# Patient Record
Sex: MALE | Race: WHITE
[De-identification: names, ages, dates, MRNs, and addresses within clinical notes are randomized per-mention and may not be internally consistent; named-entity substitution may affect disease eponyms.]

---

## 2018-05-18 PROBLEM — Z00.00 ENCOUNTER FOR PREVENTIVE HEALTH EXAMINATION: Status: ACTIVE | Noted: 2018-05-18

## 2018-05-31 PROBLEM — Z82.49 FAMILY HISTORY OF HYPERTENSION: Status: ACTIVE | Noted: 2018-05-31

## 2018-05-31 PROBLEM — Z82.69 FAMILY HISTORY OF SYSTEMIC LUPUS ERYTHEMATOSUS: Status: ACTIVE | Noted: 2018-05-31

## 2018-05-31 RX ORDER — MULTIVIT-MIN/IRON/FOLIC ACID/K 18-600-40
500 CAPSULE ORAL
Refills: 0 | Status: ACTIVE | COMMUNITY

## 2018-05-31 RX ORDER — SACUBITRIL AND VALSARTAN 49; 51 MG/1; MG/1
49-51 TABLET, FILM COATED ORAL
Qty: 180 | Refills: 0 | Status: ACTIVE | COMMUNITY

## 2018-05-31 RX ORDER — WARFARIN SODIUM 6 MG/1
6 TABLET ORAL
Refills: 0 | Status: ACTIVE | COMMUNITY

## 2018-05-31 RX ORDER — FOLIC ACID 1 MG/1
1 TABLET ORAL
Refills: 0 | Status: ACTIVE | COMMUNITY

## 2018-05-31 RX ORDER — DIGOXIN 125 UG/1
125 TABLET ORAL DAILY
Qty: 30 | Refills: 6 | Status: ACTIVE | COMMUNITY

## 2018-05-31 RX ORDER — ASPIRIN 81 MG
81 TABLET, DELAYED RELEASE (ENTERIC COATED) ORAL
Refills: 0 | Status: ACTIVE | COMMUNITY

## 2018-05-31 RX ORDER — EPLERENONE 25 MG/1
25 TABLET, COATED ORAL
Qty: 90 | Refills: 1 | Status: ACTIVE | COMMUNITY

## 2018-05-31 RX ORDER — CARVEDILOL 12.5 MG/1
12.5 TABLET, FILM COATED ORAL
Refills: 0 | Status: ACTIVE | COMMUNITY

## 2018-06-04 ENCOUNTER — APPOINTMENT (OUTPATIENT)
Dept: CARDIOLOGY | Facility: CLINIC | Age: 58
End: 2018-06-04
Payer: COMMERCIAL

## 2018-06-04 VITALS
HEART RATE: 45 BPM | OXYGEN SATURATION: 97 % | BODY MASS INDEX: 27.31 KG/M2 | HEIGHT: 67 IN | DIASTOLIC BLOOD PRESSURE: 75 MMHG | SYSTOLIC BLOOD PRESSURE: 135 MMHG | WEIGHT: 174 LBS

## 2018-06-04 VITALS
DIASTOLIC BLOOD PRESSURE: 71 MMHG | SYSTOLIC BLOOD PRESSURE: 107 MMHG | HEIGHT: 67 IN | HEART RATE: 79 BPM | WEIGHT: 145 LBS | OXYGEN SATURATION: 97 % | BODY MASS INDEX: 22.76 KG/M2

## 2018-06-04 DIAGNOSIS — Z87.898 PERSONAL HISTORY OF OTHER SPECIFIED CONDITIONS: ICD-10-CM

## 2018-06-04 DIAGNOSIS — Z82.49 FAMILY HISTORY OF ISCHEMIC HEART DISEASE AND OTHER DISEASES OF THE CIRCULATORY SYSTEM: ICD-10-CM

## 2018-06-04 DIAGNOSIS — I42.8 OTHER CARDIOMYOPATHIES: ICD-10-CM

## 2018-06-04 DIAGNOSIS — Z86.79 PERSONAL HISTORY OF OTHER DISEASES OF THE CIRCULATORY SYSTEM: ICD-10-CM

## 2018-06-04 DIAGNOSIS — Z82.69 FAMILY HISTORY OF OTHER DISEASES OF THE MUSCULOSKELETAL SYSTEM AND CONNECTIVE TISSUE: ICD-10-CM

## 2018-06-04 DIAGNOSIS — Z95.810 PRESENCE OF AUTOMATIC (IMPLANTABLE) CARDIAC DEFIBRILLATOR: ICD-10-CM

## 2018-06-04 PROCEDURE — 93284 PRGRMG EVAL IMPLANTABLE DFB: CPT

## 2018-06-04 PROCEDURE — 99203 OFFICE O/P NEW LOW 30 MIN: CPT | Mod: 25

## 2018-06-04 PROCEDURE — 93000 ELECTROCARDIOGRAM COMPLETE: CPT | Mod: 59

## 2018-06-04 RX ORDER — LOSARTAN POTASSIUM 100 MG/1
100 TABLET, FILM COATED ORAL
Refills: 0 | Status: DISCONTINUED | COMMUNITY
End: 2018-06-04

## 2018-06-04 RX ORDER — TAMSULOSIN HYDROCHLORIDE 0.4 MG/1
0.4 CAPSULE ORAL
Refills: 0 | Status: ACTIVE | COMMUNITY

## 2018-06-04 RX ORDER — CEFADROXIL 500 MG/1
500 CAPSULE ORAL
Refills: 0 | Status: ACTIVE | COMMUNITY

## 2018-06-11 ENCOUNTER — OTHER (OUTPATIENT)
Age: 58
End: 2018-06-11

## 2018-06-14 ENCOUNTER — INPATIENT (INPATIENT)
Facility: HOSPITAL | Age: 58
LOS: 6 days | Discharge: HOME | DRG: 228 | End: 2018-06-21
Attending: HOSPITALIST | Admitting: INTERNAL MEDICINE
Payer: COMMERCIAL

## 2018-06-14 VITALS
OXYGEN SATURATION: 98 % | RESPIRATION RATE: 16 BRPM | SYSTOLIC BLOOD PRESSURE: 105 MMHG | WEIGHT: 141.32 LBS | HEART RATE: 64 BPM | HEIGHT: 68 IN | DIASTOLIC BLOOD PRESSURE: 67 MMHG | TEMPERATURE: 97 F

## 2018-06-14 DIAGNOSIS — Z79.2 LONG TERM (CURRENT) USE OF ANTIBIOTICS: ICD-10-CM

## 2018-06-14 DIAGNOSIS — B95.8 UNSPECIFIED STAPHYLOCOCCUS AS THE CAUSE OF DISEASES CLASSIFIED ELSEWHERE: ICD-10-CM

## 2018-06-14 DIAGNOSIS — Q24.6 CONGENITAL HEART BLOCK: ICD-10-CM

## 2018-06-14 DIAGNOSIS — R78.81 BACTEREMIA: ICD-10-CM

## 2018-06-14 DIAGNOSIS — Z29.9 ENCOUNTER FOR PROPHYLACTIC MEASURES, UNSPECIFIED: ICD-10-CM

## 2018-06-14 DIAGNOSIS — T82.7XXA INFECTION AND INFLAMMATORY REACTION DUE TO OTHER CARDIAC AND VASCULAR DEVICES, IMPLANTS AND GRAFTS, INITIAL ENCOUNTER: ICD-10-CM

## 2018-06-14 DIAGNOSIS — Z45.2 ENCOUNTER FOR ADJUSTMENT AND MANAGEMENT OF VASCULAR ACCESS DEVICE: ICD-10-CM

## 2018-06-14 DIAGNOSIS — I50.22 CHRONIC SYSTOLIC (CONGESTIVE) HEART FAILURE: ICD-10-CM

## 2018-06-14 DIAGNOSIS — R63.8 OTHER SYMPTOMS AND SIGNS CONCERNING FOOD AND FLUID INTAKE: ICD-10-CM

## 2018-06-14 DIAGNOSIS — N40.0 BENIGN PROSTATIC HYPERPLASIA WITHOUT LOWER URINARY TRACT SYMPTOMS: ICD-10-CM

## 2018-06-14 DIAGNOSIS — Z95.2 PRESENCE OF PROSTHETIC HEART VALVE: Chronic | ICD-10-CM

## 2018-06-14 DIAGNOSIS — I48.91 UNSPECIFIED ATRIAL FIBRILLATION: ICD-10-CM

## 2018-06-14 LAB
ALBUMIN SERPL ELPH-MCNC: 3.7 G/DL — SIGNIFICANT CHANGE UP (ref 3.3–5)
ALP SERPL-CCNC: 77 U/L — SIGNIFICANT CHANGE UP (ref 40–120)
ALT FLD-CCNC: 19 U/L — SIGNIFICANT CHANGE UP (ref 10–45)
ANION GAP SERPL CALC-SCNC: 13 MMOL/L — SIGNIFICANT CHANGE UP (ref 5–17)
APTT BLD: 65.8 SEC — HIGH (ref 27.5–37.4)
AST SERPL-CCNC: 19 U/L — SIGNIFICANT CHANGE UP (ref 10–40)
BASOPHILS # BLD AUTO: 0 K/UL — SIGNIFICANT CHANGE UP (ref 0–0.2)
BASOPHILS NFR BLD AUTO: 0.2 % — SIGNIFICANT CHANGE UP (ref 0–2)
BILIRUB SERPL-MCNC: 0.4 MG/DL — SIGNIFICANT CHANGE UP (ref 0.2–1.2)
BLD GP AB SCN SERPL QL: NEGATIVE — SIGNIFICANT CHANGE UP
BUN SERPL-MCNC: 26 MG/DL — HIGH (ref 7–23)
CALCIUM SERPL-MCNC: 9.2 MG/DL — SIGNIFICANT CHANGE UP (ref 8.4–10.5)
CHLORIDE SERPL-SCNC: 102 MMOL/L — SIGNIFICANT CHANGE UP (ref 96–108)
CO2 SERPL-SCNC: 23 MMOL/L — SIGNIFICANT CHANGE UP (ref 22–31)
CREAT SERPL-MCNC: 1.2 MG/DL — SIGNIFICANT CHANGE UP (ref 0.5–1.3)
EOSINOPHIL # BLD AUTO: 0.5 K/UL — SIGNIFICANT CHANGE UP (ref 0–0.5)
EOSINOPHIL NFR BLD AUTO: 6.1 % — HIGH (ref 0–6)
GLUCOSE SERPL-MCNC: 86 MG/DL — SIGNIFICANT CHANGE UP (ref 70–99)
HCT VFR BLD CALC: 38.5 % — LOW (ref 39–50)
HGB BLD-MCNC: 13.2 G/DL — SIGNIFICANT CHANGE UP (ref 13–17)
INR BLD: 4.98 RATIO — HIGH (ref 0.88–1.16)
LYMPHOCYTES # BLD AUTO: 1.9 K/UL — SIGNIFICANT CHANGE UP (ref 1–3.3)
LYMPHOCYTES # BLD AUTO: 21.4 % — SIGNIFICANT CHANGE UP (ref 13–44)
MAGNESIUM SERPL-MCNC: 1.8 MG/DL — SIGNIFICANT CHANGE UP (ref 1.6–2.6)
MCHC RBC-ENTMCNC: 32.5 PG — SIGNIFICANT CHANGE UP (ref 27–34)
MCHC RBC-ENTMCNC: 34.3 GM/DL — SIGNIFICANT CHANGE UP (ref 32–36)
MCV RBC AUTO: 94.5 FL — SIGNIFICANT CHANGE UP (ref 80–100)
MONOCYTES # BLD AUTO: 0.7 K/UL — SIGNIFICANT CHANGE UP (ref 0–0.9)
MONOCYTES NFR BLD AUTO: 7.8 % — SIGNIFICANT CHANGE UP (ref 2–14)
NEUTROPHILS # BLD AUTO: 5.8 K/UL — SIGNIFICANT CHANGE UP (ref 1.8–7.4)
NEUTROPHILS NFR BLD AUTO: 64.5 % — SIGNIFICANT CHANGE UP (ref 43–77)
PLATELET # BLD AUTO: 183 K/UL — SIGNIFICANT CHANGE UP (ref 150–400)
POTASSIUM SERPL-MCNC: 4.3 MMOL/L — SIGNIFICANT CHANGE UP (ref 3.5–5.3)
POTASSIUM SERPL-SCNC: 4.3 MMOL/L — SIGNIFICANT CHANGE UP (ref 3.5–5.3)
PROT SERPL-MCNC: 6.7 G/DL — SIGNIFICANT CHANGE UP (ref 6–8.3)
PROTHROM AB SERPL-ACNC: 56.1 SEC — HIGH (ref 9.8–12.7)
RBC # BLD: 4.07 M/UL — LOW (ref 4.2–5.8)
RBC # FLD: 12 % — SIGNIFICANT CHANGE UP (ref 10.3–14.5)
RH IG SCN BLD-IMP: POSITIVE — SIGNIFICANT CHANGE UP
SODIUM SERPL-SCNC: 138 MMOL/L — SIGNIFICANT CHANGE UP (ref 135–145)
WBC # BLD: 9 K/UL — SIGNIFICANT CHANGE UP (ref 3.8–10.5)
WBC # FLD AUTO: 9 K/UL — SIGNIFICANT CHANGE UP (ref 3.8–10.5)

## 2018-06-14 PROCEDURE — 99223 1ST HOSP IP/OBS HIGH 75: CPT

## 2018-06-14 PROCEDURE — 99253 IP/OBS CNSLTJ NEW/EST LOW 45: CPT

## 2018-06-14 PROCEDURE — 93010 ELECTROCARDIOGRAM REPORT: CPT

## 2018-06-14 PROCEDURE — 99254 IP/OBS CNSLTJ NEW/EST MOD 60: CPT

## 2018-06-14 PROCEDURE — 71045 X-RAY EXAM CHEST 1 VIEW: CPT | Mod: 26

## 2018-06-14 RX ORDER — CARVEDILOL PHOSPHATE 80 MG/1
12.5 CAPSULE, EXTENDED RELEASE ORAL EVERY 12 HOURS
Qty: 0 | Refills: 0 | Status: DISCONTINUED | OUTPATIENT
Start: 2018-06-14 | End: 2018-06-21

## 2018-06-14 RX ORDER — SACUBITRIL AND VALSARTAN 24; 26 MG/1; MG/1
1 TABLET, FILM COATED ORAL
Qty: 0 | Refills: 0 | Status: DISCONTINUED | OUTPATIENT
Start: 2018-06-14 | End: 2018-06-21

## 2018-06-14 RX ORDER — DAPTOMYCIN 500 MG/10ML
500 INJECTION, POWDER, LYOPHILIZED, FOR SOLUTION INTRAVENOUS EVERY 24 HOURS
Qty: 0 | Refills: 0 | Status: DISCONTINUED | OUTPATIENT
Start: 2018-06-15 | End: 2018-06-21

## 2018-06-14 RX ORDER — DAPTOMYCIN 500 MG/10ML
500 INJECTION, POWDER, LYOPHILIZED, FOR SOLUTION INTRAVENOUS ONCE
Qty: 0 | Refills: 0 | Status: COMPLETED | OUTPATIENT
Start: 2018-06-14 | End: 2018-06-14

## 2018-06-14 RX ORDER — EPLERENONE 50 MG/1
25 TABLET, FILM COATED ORAL DAILY
Qty: 0 | Refills: 0 | Status: DISCONTINUED | OUTPATIENT
Start: 2018-06-14 | End: 2018-06-21

## 2018-06-14 RX ORDER — CHOLECALCIFEROL (VITAMIN D3) 125 MCG
1000 CAPSULE ORAL DAILY
Qty: 0 | Refills: 0 | Status: DISCONTINUED | OUTPATIENT
Start: 2018-06-14 | End: 2018-06-21

## 2018-06-14 RX ORDER — TAMSULOSIN HYDROCHLORIDE 0.4 MG/1
0.4 CAPSULE ORAL AT BEDTIME
Qty: 0 | Refills: 0 | Status: DISCONTINUED | OUTPATIENT
Start: 2018-06-14 | End: 2018-06-21

## 2018-06-14 RX ORDER — DIGOXIN 250 MCG
0.12 TABLET ORAL DAILY
Qty: 0 | Refills: 0 | Status: DISCONTINUED | OUTPATIENT
Start: 2018-06-14 | End: 2018-06-21

## 2018-06-14 RX ORDER — DAPTOMYCIN 500 MG/10ML
INJECTION, POWDER, LYOPHILIZED, FOR SOLUTION INTRAVENOUS
Qty: 0 | Refills: 0 | Status: DISCONTINUED | OUTPATIENT
Start: 2018-06-14 | End: 2018-06-21

## 2018-06-14 RX ADMIN — CARVEDILOL PHOSPHATE 12.5 MILLIGRAM(S): 80 CAPSULE, EXTENDED RELEASE ORAL at 19:11

## 2018-06-14 RX ADMIN — TAMSULOSIN HYDROCHLORIDE 0.4 MILLIGRAM(S): 0.4 CAPSULE ORAL at 22:18

## 2018-06-14 RX ADMIN — Medication 1 TABLET(S): at 19:12

## 2018-06-14 RX ADMIN — Medication 1000 UNIT(S): at 19:12

## 2018-06-14 RX ADMIN — DAPTOMYCIN 120 MILLIGRAM(S): 500 INJECTION, POWDER, LYOPHILIZED, FOR SOLUTION INTRAVENOUS at 19:11

## 2018-06-14 RX ADMIN — SACUBITRIL AND VALSARTAN 1 TABLET(S): 24; 26 TABLET, FILM COATED ORAL at 20:58

## 2018-06-14 NOTE — H&P ADULT - ASSESSMENT
57 yo male with hx of congenital heart block, HFrEF (25%), afib, MVR, who presents for planned lead extraction in the setting of previous bacteremia

## 2018-06-14 NOTE — PROCEDURE NOTE - ADDITIONAL PROCEDURE DETAILS
-Indication for interrogation: Check underline rhythm/dependency   -Presenting rhythm: Afib/ BiV paced at 60 bpm   -Measured data WNL, NL BiV ICD function, Pt is PM dependent  -Stored data revealed no events for review since last interrogation on 6/4/2018  -OptiVol fluid index WNL, this is indicative of euvolemia  -Changes made: none  -EP consult note to follow    [id number]

## 2018-06-14 NOTE — H&P ADULT - HISTORY OF PRESENT ILLNESS
59 yo male with pmhx of congential heart block s/p PPM in 1990 with upgrade to AICD in 2015, mitral valve replacement on Coumadin, HFrEF, BPH, afib, who presents for elective lead extraction for concern of infected AICD.  Pt was admitted in April of 2018 to [facility] where he was found to have persistent MSSA bacteremia thought to be possibly 2/2 infected PPM leads and has been on Daptomycin since then.  Pt had seen Dr. Butcher in the clinic last week and was instructed to come to [facility] today for planned lead extraction tomorrow.  Currently pt denies any CP, SOB, n/v, fevers or chills.

## 2018-06-14 NOTE — CONSULT NOTE ADULT - ATTENDING COMMENTS
Gasper Alegria  Attending Physician   Division of Infectious Disease  Pager #[phone]  After 5pm/weekend or no response, call #[phone]

## 2018-06-14 NOTE — H&P ADULT - PMH
Atrial fibrillation    BPH (benign prostatic hyperplasia)    Congenital heart block    Congestive heart failure

## 2018-06-14 NOTE — H&P ADULT - PROBLEM SELECTOR PLAN 3
- Pt on Daptomycin, ID consulted to help guidance in terms of duration as he has been on it since April  - Check blood cultures

## 2018-06-14 NOTE — CONSULT NOTE ADULT - ASSESSMENT
59 yo male with pmhx of congential heart block s/p PPM in 1990 with upgrade to AICD in 2015, mitral valve replacement on Coumadin, HFrEF, BPH, afib, who presents for elective lead extraction for concern of infected AICD with staph infection hx with PICC in place and on long term datpomycin

## 2018-06-14 NOTE — CONSULT NOTE ADULT - PROBLEM SELECTOR RECOMMENDATION 9
-cont daptomycin 500 mg iv q24  -check CPK level in AM  -will touch base with ID physician at [facility] to discuss  -agree with lead and AICD extraction - check OR cultures please

## 2018-06-14 NOTE — H&P ADULT - NSHPPHYSICALEXAM_GEN_ALL_CORE
VITALS  Vital Signs Last 24 Hrs  T(C): 36.3 (14 Jun 2018 15:08), Max: 36.3 (14 Jun 2018 15:08)  T(F): 97.3 (14 Jun 2018 15:08), Max: 97.3 (14 Jun 2018 15:08)  HR: 64 (14 Jun 2018 15:08) (64 - 64)  BP: 105/67 (14 Jun 2018 15:08) (105/67 - 105/67)  BP(mean): --  RR: 16 (14 Jun 2018 15:08) (16 - 16)  SpO2: 98% (14 Jun 2018 15:08) (98% - 98%)    I&O's Summary      PHYSICAL EXAM  General: A&Ox3; NAD  Head: NC/AT;  Eyes: PERRL; EOMI; anicteric sclera  Neck: Supple; no JVD  Respiratory: CTA B/L; no wheezes/crackles/rales   Cardiovascular: Irregular rhythm/rate; S1/S2, mechanical click at apex  Gastrointestinal: Soft; NTND w/out rebound tenderness or guarding; bowel sounds normal  Extremities: WWP; no edema or cyanosis  Neurological:  CNII-XII grossly intact; no obvious focal deficits

## 2018-06-14 NOTE — CONSULT NOTE ADULT - ASSESSMENT
57 yo male with pmhx of Afib, TIAs, congential heart block s/p PPM 5/10/1990, Non-ischemic cardiomyopathy (Echo 4/19/18: EF 25%), s/p upgrade to CRT-D 4/13/2011 followed by generator change and RV lead revision 2/2/2015, Mitral regurgitation s/p mechanical MVR in 2000 (on Coumadin), BPH, who presents as direct admit for BiV ICD and lead extraction secondary to blood cultures with MSSA bacteremia. Pt was admitted in April of 2018 to [facility] ([facility] where he was found to have persistent MSSA bacteremia and has been on Daptomycin since then. BiV ICD interrogated today revealed patient is pacemaker dependent.     lead information:   -Atrial lead: Guidant, model 432-02 (5/10/1990)  -RV lead: [employer], model 7120Q (2/2/2015)  -LV lead: NIRANJAN, model 4194 (4/13/2011)  -There is a abandoned RV pacing lead from 5/10/1990 and RV shocking lead from 4/13/2011    Plan:  -Admit to telemetry under medicine service  -CXR for number of leads  -Follow up INR level, probably have to hold Coumadin and no Heparin overnight since patient took his regular dose of coumadin last night (last INR was 3.5 on Monday 6/11/18)  -NPO after midnight for OR tomorrow at 7:30am  -Continue with Daptomycin   -ID consult called     ROMEO Scruggs, ANP  [id number] 57 yo male with pmhx of Afib, TIAs, congential heart block s/p PPM 5/10/1990, Non-ischemic cardiomyopathy (Echo 4/19/18: EF 25%), s/p upgrade to CRT-D 4/13/2011 followed by generator change and RV lead revision 2/2/2015, Mitral regurgitation s/p mechanical MVR in 2000 (on Coumadin), BPH, who presents as direct admit for BiV ICD and lead extraction secondary to blood cultures with MSSA bacteremia. Pt was admitted in April of 2018 to [facility] (formerly [facility]] where he was found to have persistent MSSA bacteremia and has been on Daptomycin since then. BiV ICD interrogated today revealed patient is pacemaker dependent.     lead information:   -Atrial lead: Guidant, model 432-02 (5/10/1990)  -RV lead: [employer], model 7120Q (2/2/2015)  -LV lead: NIRANJAN, model 4194 (4/13/2011)  -There is a abandoned RV pacing lead from 5/10/1990 and RV shocking lead from 4/13/2011    Plan:  -Admit to telemetry under medicine service  -CXR for number of leads  -Follow up INR level, probably have to hold Coumadin and no Heparin overnight since patient took his regular dose of coumadin last night (last INR was 3.5 on Monday 6/11/18)  -NPO after midnight for ICD lead extractions and re-implant with temporary wire   -Continue with Daptomycin   -ID consult called     ROMEO Scruggs, ANP  [phone]

## 2018-06-14 NOTE — H&P ADULT - PROBLEM SELECTOR PLAN 1
- Pt presents for elective lead extraction as there is concern that his MSSA bacteremia was from his infected device.  - EP to evaluate pt to decide further a/c, as pt is scheduled for OR tomorrow

## 2018-06-14 NOTE — CONSULT NOTE ADULT - SUBJECTIVE AND OBJECTIVE BOX
CHIEF COMPLAINT: Present as direct admit for ICD device and lead extraction     HISTORY OF PRESENT ILLNESS:  59 yo male with pmhx of Afib, TIAs, congential heart block s/p PPM 5/10/1990, Non-ischemic cardiomyopathy (Echo 4/19/18: EF 25%), s/p upgrade to CRT-D 4/13/2011 followed by generator change and RV lead revision 2/2/2015, Mitral regurgitation s/p mechanical MVR in 2000 (on Coumadin), BPH, who presents for elective BiV ICD and lead extraction secondary to blood cultures with MSSA bacteremia.  Pt was admitted in April of 2018 to [facility] where he was found to have persistent MSSA bacteremia thought to be possibly 2/2 infected BiV ICD leads and has been on Daptomycin since then.  Pt had seen Dr. Butcher in the clinic last week and was instructed to come to [facility] today for planned lead extraction tomorrow.  Currently pt denies any CP, SOB, n/v, fevers or chills.        Allergies    No Known Allergies    Intolerances    	  MEDICATIONS:  carvedilol 12.5 milliGRAM(s) Oral every 12 hours  tamsulosin 0.4 milliGRAM(s) Oral at bedtime    DAPTOmycin IVPB      DAPTOmycin IVPB 500 milliGRAM(s) IV Intermittent once      cholecalciferol 1000 Unit(s) Oral daily  multivitamin 1 Tablet(s) Oral daily      PAST MEDICAL & SURGICAL HISTORY:  BPH (benign prostatic hyperplasia)  Atrial fibrillation  Congestive heart failure  Congenital heart block  H/O heart valve replacement with mechanical valve    FAMILY HISTORY:  No pertinent family history in first degree relatives      REVIEW OF SYSTEMS:  See HPI. Otherwise, 10 point ROS done and otherwise negative.    PHYSICAL EXAM:  T(C): 36.3 (06-14-18 @ 15:08), Max: 36.3 (06-14-18 @ 15:08)  HR: 64 (06-14-18 @ 15:08) (64 - 64)  BP: 105/67 (06-14-18 @ 15:08) (105/67 - 105/67)  RR: 16 (06-14-18 @ 15:08) (16 - 16)  SpO2: 98% (06-14-18 @ 15:08) (98% - 98%)  I&O's Summary      Appearance: Normal	  HEENT:   Normal oral mucosa, PERRL, EOMI	  Lymphatic: No lymphadenopathy  Cardiovascular: Normal S1 S2, No JVD, No murmurs  Respiratory: Lungs clear to auscultation	  Psychiatry: A & O x 3, Mood & affect appropriate  Gastrointestinal:  Soft, Non-tender, + BS	  Skin: No rashes, No ecchymoses, No cyanosis	  Neurologic: Non-focal  Extremities: Normal range of motion, No clubbing, cyanosis or edema. Right upper arm PICC line in place   Vascular: Peripheral pulses palpable 2+ bilaterally      LABS: Result pending 	 	      TELEMETRY: Afib/ V paced at 60 bpm  	    ECG: Not done at the moment
RYAN GONSALVES 58y Male  MRN-[id number]    Patient is a 58y old  Male who presents with a chief complaint of lead extraction (14 Jun 2018 15:54)      HPI:  57 yo male with pmhx of congential heart block s/p PPM in 1990 with upgrade to AICD in 2015, mitral valve replacement on Coumadin, HFrEF, BPH, afib, who presents for elective lead extraction for concern of infected AICD.  Pt was admitted in April of 2018 to [facility] where he was found to have persistent MSSA bacteremia thought to be possibly 2/2 infected PPM leads and has been on Daptomycin since then.  Pt had seen Dr. Butcher in the clinic last week and was instructed to come to [facility] today for planned lead extraction tomorrow.  Currently pt denies any CP, SOB, n/v, fevers or chills. (14 Jun 2018 15:54)    Feels well. On 7 weeks abx at this time. No complaints with PICC.    PAST MEDICAL & SURGICAL HISTORY:  BPH (benign prostatic hyperplasia)  Atrial fibrillation  Congestive heart failure  Congenital heart block  H/O heart valve replacement with mechanical valve  AICD      Allergies    No Known Allergies    Intolerances        ANTIMICROBIALS:      MEDICATIONS  (STANDING):  carvedilol 12.5 milliGRAM(s) Oral every 12 hours  cholecalciferol 1000 Unit(s) Oral daily  multivitamin 1 Tablet(s) Oral daily  tamsulosin 0.4 milliGRAM(s) Oral at bedtime      Social History  Smoking: no  Etoh: no  Drug use: no      FAMILY HISTORY:  No pertinent family history in first degree relatives      Vital Signs Last 24 Hrs  T(C): 36.3 (14 Jun 2018 15:08), Max: 36.3 (14 Jun 2018 15:08)  T(F): 97.3 (14 Jun 2018 15:08), Max: 97.3 (14 Jun 2018 15:08)  HR: 64 (14 Jun 2018 15:08) (64 - 64)  BP: 105/67 (14 Jun 2018 15:08) (105/67 - 105/67)  BP(mean): --  RR: 16 (14 Jun 2018 15:08) (16 - 16)  SpO2: 98% (14 Jun 2018 15:08) (98% - 98%)      MICROBIOLOGY:    RADIOLOGY

## 2018-06-14 NOTE — H&P ADULT - NSHPLABSRESULTS_GEN_ALL_CORE
.  LABS:                         EKG Reviewed by me:    Xray reviewed by me:    Consultant notes reviewed: .  LABS:                         13.2   9.0   )-----------( 183      ( 14 Jun 2018 18:23 )             38.5     06-14    138  |  102  |  26<H>  ----------------------------<  86  4.3   |  23  |  1.20    Ca    9.2      14 Jun 2018 18:23  Mg     1.8     06-14    TPro  6.7  /  Alb  3.7  /  TBili  0.4  /  DBili  x   /  AST  19  /  ALT  19  /  AlkPhos  77  06-14    PT/INR - ( 14 Jun 2018 18:23 )   PT: 56.1 sec;   INR: 4.98 ratio         PTT - ( 14 Jun 2018 18:23 )  PTT:65.8 sec            Xray reviewed by me: Clear lungs, PICC in place    Consultant notes reviewed: ID, EP

## 2018-06-15 LAB
ANION GAP SERPL CALC-SCNC: 11 MMOL/L — SIGNIFICANT CHANGE UP (ref 5–17)
APTT BLD: 60 SEC — HIGH (ref 27.5–37.4)
BUN SERPL-MCNC: 22 MG/DL — SIGNIFICANT CHANGE UP (ref 7–23)
CALCIUM SERPL-MCNC: 9.4 MG/DL — SIGNIFICANT CHANGE UP (ref 8.4–10.5)
CHLORIDE SERPL-SCNC: 102 MMOL/L — SIGNIFICANT CHANGE UP (ref 96–108)
CK SERPL-CCNC: 52 U/L — SIGNIFICANT CHANGE UP (ref 30–200)
CO2 SERPL-SCNC: 27 MMOL/L — SIGNIFICANT CHANGE UP (ref 22–31)
CREAT SERPL-MCNC: 1.25 MG/DL — SIGNIFICANT CHANGE UP (ref 0.5–1.3)
GLUCOSE SERPL-MCNC: 85 MG/DL — SIGNIFICANT CHANGE UP (ref 70–99)
HCT VFR BLD CALC: 41.2 % — SIGNIFICANT CHANGE UP (ref 39–50)
HGB BLD-MCNC: 13.8 G/DL — SIGNIFICANT CHANGE UP (ref 13–17)
INR BLD: 3.68 RATIO — HIGH (ref 0.88–1.16)
MCHC RBC-ENTMCNC: 30.9 PG — SIGNIFICANT CHANGE UP (ref 27–34)
MCHC RBC-ENTMCNC: 33.5 GM/DL — SIGNIFICANT CHANGE UP (ref 32–36)
MCV RBC AUTO: 92.4 FL — SIGNIFICANT CHANGE UP (ref 80–100)
PLATELET # BLD AUTO: 210 K/UL — SIGNIFICANT CHANGE UP (ref 150–400)
POTASSIUM SERPL-MCNC: 4.3 MMOL/L — SIGNIFICANT CHANGE UP (ref 3.5–5.3)
POTASSIUM SERPL-SCNC: 4.3 MMOL/L — SIGNIFICANT CHANGE UP (ref 3.5–5.3)
PROTHROM AB SERPL-ACNC: 42.7 SEC — HIGH (ref 10–13.1)
RBC # BLD: 4.46 M/UL — SIGNIFICANT CHANGE UP (ref 4.2–5.8)
RBC # FLD: 13.2 % — SIGNIFICANT CHANGE UP (ref 10.3–14.5)
RH IG SCN BLD-IMP: POSITIVE — SIGNIFICANT CHANGE UP
SODIUM SERPL-SCNC: 140 MMOL/L — SIGNIFICANT CHANGE UP (ref 135–145)
WBC # BLD: 7.17 K/UL — SIGNIFICANT CHANGE UP (ref 3.8–10.5)
WBC # FLD AUTO: 7.17 K/UL — SIGNIFICANT CHANGE UP (ref 3.8–10.5)

## 2018-06-15 PROCEDURE — 99232 SBSQ HOSP IP/OBS MODERATE 35: CPT

## 2018-06-15 PROCEDURE — 99233 SBSQ HOSP IP/OBS HIGH 50: CPT

## 2018-06-15 RX ORDER — LACTOBACILLUS ACIDOPHILUS 100MM CELL
1 CAPSULE ORAL
Qty: 0 | Refills: 0 | Status: DISCONTINUED | OUTPATIENT
Start: 2018-06-15 | End: 2018-06-21

## 2018-06-15 RX ORDER — SACUBITRIL AND VALSARTAN 24; 26 MG/1; MG/1
1 TABLET, FILM COATED ORAL
Qty: 0 | Refills: 0 | Status: DISCONTINUED | OUTPATIENT
Start: 2018-06-15 | End: 2018-06-15

## 2018-06-15 RX ADMIN — TAMSULOSIN HYDROCHLORIDE 0.4 MILLIGRAM(S): 0.4 CAPSULE ORAL at 21:12

## 2018-06-15 RX ADMIN — CARVEDILOL PHOSPHATE 12.5 MILLIGRAM(S): 80 CAPSULE, EXTENDED RELEASE ORAL at 17:38

## 2018-06-15 RX ADMIN — Medication 1 TABLET(S): at 13:03

## 2018-06-15 RX ADMIN — Medication 0.12 MILLIGRAM(S): at 05:39

## 2018-06-15 RX ADMIN — DAPTOMYCIN 120 MILLIGRAM(S): 500 INJECTION, POWDER, LYOPHILIZED, FOR SOLUTION INTRAVENOUS at 17:38

## 2018-06-15 RX ADMIN — Medication 1 TABLET(S): at 17:38

## 2018-06-15 RX ADMIN — Medication 1000 UNIT(S): at 13:03

## 2018-06-15 RX ADMIN — SACUBITRIL AND VALSARTAN 1 TABLET(S): 24; 26 TABLET, FILM COATED ORAL at 05:39

## 2018-06-15 RX ADMIN — Medication 1 TABLET(S): at 13:00

## 2018-06-15 RX ADMIN — CARVEDILOL PHOSPHATE 12.5 MILLIGRAM(S): 80 CAPSULE, EXTENDED RELEASE ORAL at 05:39

## 2018-06-15 RX ADMIN — SACUBITRIL AND VALSARTAN 1 TABLET(S): 24; 26 TABLET, FILM COATED ORAL at 17:38

## 2018-06-15 RX ADMIN — EPLERENONE 25 MILLIGRAM(S): 50 TABLET, FILM COATED ORAL at 05:39

## 2018-06-15 NOTE — PROGRESS NOTE ADULT - ASSESSMENT
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 Pt was admitted in April of 2018 to [facility] where he was found to have persistent MSSA bacteremia and has been on Daptomycin since then. BiV ICD interrogated 6/14/18 revealed patient is pacemaker dependent. Now awaiting for coumadin washout for goal INR around 2.5.      lead information:   -Atrial lead: Guidant, model 432-02 (5/10/1990)  -RV lead: [employer], model 7120Q (2/2/2015)  -LV lead: NIRANJAN, model 4194 (4/13/2011)  -There is a abandoned RV pacing lead from 5/10/1990 and RV shocking lead from 4/13/2011    Plan:  -CXR revealed   -ID consult appreciated, c/w Daptomycin   -f/u blood cultures   -OR canceled this am 2/2 elevated INR  -PLEASE KEEP GOAL INR AROUND 2.5  -Plan for ICD lead extractions and re-implant with temporary wire on Monday (6/18/18)      ROMEO Scruggs, ANP  [id number] 57 yo male with pmhx of Afib, TIAs, congential heart block s/p PPM 5/10/1990, Non-ischemic cardiomyopathy (Echo 4/19/18: EF 25%), s/p upgrade to CRT-D 4/13/2011 followed by generator change and RV lead revision 2/2/2015, Mitral regurgitation s/p mechanical MVR in 2000 (on Coumadin), BPH, who presents as direct admit for BiV ICD and lead extraction secondary to blood cultures with MSSA bacteremia. Pt was admitted in April of 2018 to [facility] where he was found to have persistent MSSA bacteremia and has been on Daptomycin since then. BiV ICD interrogated 6/14/18 revealed patient is pacemaker dependent. Now awaiting for coumadin washout for goal INR around 2.5.      lead information:   -Atrial lead: Guidant, model 432-02 (5/10/1990)  -RV lead: [employer], model 7120Q (2/2/2015)  -LV lead: NIRANJAN, model 4194 (4/13/2011)  -There is a abandoned RV pacing lead from 5/10/1990 and RV shocking lead from 4/13/2011    Plan:  -CXR revealed   -ID consult appreciated, c/w Daptomycin   -f/u blood cultures   -OR canceled this am 2/2 elevated INR (INR today 3.68)  -PLEASE KEEP GOAL INR AROUND 2.5  -Plan for ICD lead extractions and re-implant with temporary wire on Monday (6/18/18)      ROMEO Scruggs, ANP  [id number] 59 yo male with pmhx of Afib, TIAs, congential heart block s/p PPM 5/10/1990, Non-ischemic cardiomyopathy (Echo 4/19/18: EF 25%), s/p upgrade to CRT-D 4/13/2011 followed by generator change and RV lead revision 2/2/2015, Mitral regurgitation s/p mechanical MVR in 2000 (on Coumadin), BPH, who presents as direct admit for BiV ICD and lead extraction secondary to blood cultures with MSSA bacteremia. Pt was admitted in April of 2018 to [facility] ([facility] where he was found to have persistent MSSA bacteremia and has been on Daptomycin since then. BiV ICD interrogated 6/14/18 revealed patient is pacemaker dependent. Now awaiting for coumadin washout for goal INR around 2.5.      lead information:   -Atrial lead: Guidant, model 432-02 (5/10/1990)  -RV lead: [employer], model 7120Q (2/2/2015)  -LV lead: NIRANJAN, model 4194 (4/13/2011)  -There is a abandoned RV pacing lead from 5/10/1990 and RV shocking lead from 4/13/2011    Plan:  -CXR revealed   -ID consult appreciated, c/w Daptomycin   -f/u blood cultures   -OR canceled this am 2/2 elevated INR (INR today 3.68)  -PLEASE KEEP GOAL INR AROUND 2.5  -Plan for ICD lead extractions and re-implant with temporary wire on Tuesday (6/19/18)      ROMEO Scruggs, ANP  [phone]

## 2018-06-15 NOTE — PROGRESS NOTE ADULT - PROBLEM SELECTOR PLAN 4
-C/w Coreg and digoxin.  -Hold coumadin for now.   -INR still supratherapeutic at 3.68.  -If INR goes less than 2.5, will need to start heparin drip since patient has mechanical MVR.

## 2018-06-15 NOTE — PROGRESS NOTE ADULT - ASSESSMENT
58 year old male with PMH of congenital heart block, HFrEF (25%), Afib, mechanical MVR; presented for planned lead extraction in the setting of previous MSSA bacteremia.

## 2018-06-15 NOTE — PROGRESS NOTE ADULT - PROBLEM SELECTOR PLAN 3
-Patient on Daptomycin, ID consulted to help guidance in terms of duration as he has been on it since April  -Blood cultures testing.   -C/w daptomycin per ID recs.   -Check weekly CK levels, so far acceptable.

## 2018-06-16 LAB
APTT BLD: > 200 SEC (ref 27.5–37.4)
HCT VFR BLD CALC: 40.5 % — SIGNIFICANT CHANGE UP (ref 39–50)
HCT VFR BLD CALC: 40.8 % — SIGNIFICANT CHANGE UP (ref 39–50)
HGB BLD-MCNC: 13.4 G/DL — SIGNIFICANT CHANGE UP (ref 13–17)
HGB BLD-MCNC: 14.1 G/DL — SIGNIFICANT CHANGE UP (ref 13–17)
INR BLD: 2.16 RATIO — HIGH (ref 0.88–1.16)
MCHC RBC-ENTMCNC: 30.2 PG — SIGNIFICANT CHANGE UP (ref 27–34)
MCHC RBC-ENTMCNC: 32.8 GM/DL — SIGNIFICANT CHANGE UP (ref 32–36)
MCHC RBC-ENTMCNC: 33.1 PG — SIGNIFICANT CHANGE UP (ref 27–34)
MCHC RBC-ENTMCNC: 34.8 GM/DL — SIGNIFICANT CHANGE UP (ref 32–36)
MCV RBC AUTO: 92.1 FL — SIGNIFICANT CHANGE UP (ref 80–100)
MCV RBC AUTO: 95.2 FL — SIGNIFICANT CHANGE UP (ref 80–100)
PLATELET # BLD AUTO: 206 K/UL — SIGNIFICANT CHANGE UP (ref 150–400)
PLATELET # BLD AUTO: 207 K/UL — SIGNIFICANT CHANGE UP (ref 150–400)
PROTHROM AB SERPL-ACNC: 24.8 SEC — HIGH (ref 10–13.1)
RBC # BLD: 4.26 M/UL — SIGNIFICANT CHANGE UP (ref 4.2–5.8)
RBC # BLD: 4.43 M/UL — SIGNIFICANT CHANGE UP (ref 4.2–5.8)
RBC # FLD: 12 % — SIGNIFICANT CHANGE UP (ref 10.3–14.5)
RBC # FLD: 13 % — SIGNIFICANT CHANGE UP (ref 10.3–14.5)
WBC # BLD: 7.76 K/UL — SIGNIFICANT CHANGE UP (ref 3.8–10.5)
WBC # BLD: 9.4 K/UL — SIGNIFICANT CHANGE UP (ref 3.8–10.5)
WBC # FLD AUTO: 7.76 K/UL — SIGNIFICANT CHANGE UP (ref 3.8–10.5)
WBC # FLD AUTO: 9.4 K/UL — SIGNIFICANT CHANGE UP (ref 3.8–10.5)

## 2018-06-16 PROCEDURE — 99232 SBSQ HOSP IP/OBS MODERATE 35: CPT

## 2018-06-16 PROCEDURE — 99233 SBSQ HOSP IP/OBS HIGH 50: CPT

## 2018-06-16 RX ORDER — HEPARIN SODIUM 5000 [USP'U]/ML
5000 INJECTION INTRAVENOUS; SUBCUTANEOUS ONCE
Qty: 0 | Refills: 0 | Status: COMPLETED | OUTPATIENT
Start: 2018-06-16 | End: 2018-06-16

## 2018-06-16 RX ORDER — WARFARIN SODIUM 2.5 MG/1
3 TABLET ORAL ONCE
Qty: 0 | Refills: 0 | Status: COMPLETED | OUTPATIENT
Start: 2018-06-16 | End: 2018-06-16

## 2018-06-16 RX ORDER — HEPARIN SODIUM 5000 [USP'U]/ML
2500 INJECTION INTRAVENOUS; SUBCUTANEOUS EVERY 6 HOURS
Qty: 0 | Refills: 0 | Status: DISCONTINUED | OUTPATIENT
Start: 2018-06-16 | End: 2018-06-19

## 2018-06-16 RX ORDER — HEPARIN SODIUM 5000 [USP'U]/ML
5000 INJECTION INTRAVENOUS; SUBCUTANEOUS EVERY 6 HOURS
Qty: 0 | Refills: 0 | Status: DISCONTINUED | OUTPATIENT
Start: 2018-06-16 | End: 2018-06-19

## 2018-06-16 RX ORDER — HEPARIN SODIUM 5000 [USP'U]/ML
INJECTION INTRAVENOUS; SUBCUTANEOUS
Qty: 25000 | Refills: 0 | Status: DISCONTINUED | OUTPATIENT
Start: 2018-06-16 | End: 2018-06-19

## 2018-06-16 RX ADMIN — Medication 0.12 MILLIGRAM(S): at 05:18

## 2018-06-16 RX ADMIN — HEPARIN SODIUM 900 UNIT(S)/HR: 5000 INJECTION INTRAVENOUS; SUBCUTANEOUS at 19:24

## 2018-06-16 RX ADMIN — CARVEDILOL PHOSPHATE 12.5 MILLIGRAM(S): 80 CAPSULE, EXTENDED RELEASE ORAL at 18:30

## 2018-06-16 RX ADMIN — DAPTOMYCIN 120 MILLIGRAM(S): 500 INJECTION, POWDER, LYOPHILIZED, FOR SOLUTION INTRAVENOUS at 18:16

## 2018-06-16 RX ADMIN — SACUBITRIL AND VALSARTAN 1 TABLET(S): 24; 26 TABLET, FILM COATED ORAL at 18:30

## 2018-06-16 RX ADMIN — HEPARIN SODIUM 5000 UNIT(S): 5000 INJECTION INTRAVENOUS; SUBCUTANEOUS at 11:33

## 2018-06-16 RX ADMIN — TAMSULOSIN HYDROCHLORIDE 0.4 MILLIGRAM(S): 0.4 CAPSULE ORAL at 21:12

## 2018-06-16 RX ADMIN — HEPARIN SODIUM 0 UNIT(S)/HR: 5000 INJECTION INTRAVENOUS; SUBCUTANEOUS at 18:13

## 2018-06-16 RX ADMIN — CARVEDILOL PHOSPHATE 12.5 MILLIGRAM(S): 80 CAPSULE, EXTENDED RELEASE ORAL at 09:15

## 2018-06-16 RX ADMIN — SACUBITRIL AND VALSARTAN 1 TABLET(S): 24; 26 TABLET, FILM COATED ORAL at 09:15

## 2018-06-16 RX ADMIN — HEPARIN SODIUM 1100 UNIT(S)/HR: 5000 INJECTION INTRAVENOUS; SUBCUTANEOUS at 11:32

## 2018-06-16 RX ADMIN — WARFARIN SODIUM 3 MILLIGRAM(S): 2.5 TABLET ORAL at 21:12

## 2018-06-16 RX ADMIN — Medication 1 TABLET(S): at 18:17

## 2018-06-16 RX ADMIN — Medication 1000 UNIT(S): at 12:03

## 2018-06-16 RX ADMIN — Medication 1 TABLET(S): at 12:03

## 2018-06-16 RX ADMIN — Medication 1 TABLET(S): at 08:51

## 2018-06-16 RX ADMIN — EPLERENONE 25 MILLIGRAM(S): 50 TABLET, FILM COATED ORAL at 09:15

## 2018-06-16 NOTE — PROGRESS NOTE ADULT - PROBLEM SELECTOR PLAN 7
-Supratherapeutic on coumadin which covers DVT PPx. -On heparin drip and coumadin which covers DVT PPx.

## 2018-06-16 NOTE — PROGRESS NOTE ADULT - PROBLEM SELECTOR PLAN 4
-C/w Coreg and digoxin.  -Hold coumadin for now.   -INR still supratherapeutic at 3.68.  -If INR goes less than 2.5, will need to start heparin drip since patient has mechanical MVR. -C/w Coreg and digoxin.  -INR today 2.16.   -Will start heparin drip with bolus since patient has mechanical MVR and now INR < 2.5.   -Will give coumadin 3mg at bedtime tonight.   -Monitor coags.

## 2018-06-16 NOTE — PROGRESS NOTE ADULT - ASSESSMENT
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 Pt was admitted in April of 2018 to [facility] where he was found to have persistent MSSA bacteremia and has been on Daptomycin since then. BiV ICD interrogated 6/14/18 revealed patient is pacemaker dependent. Now awaiting for coumadin washout for goal INR around 2.5.    # MSSA bacteremia    -Plan for ICD lead extraction and re-implant with temporary wire on Tuesday (6/19/18)  - c/w  antimicrobial therapy per ID recommendation    -f/u final blood culture result ; prelim NGTD   - AC: PLEASE KEEP  GOAL INR AROUND 2.5 FOR PROCEDURE ON TUESDAY;   today's INR result pending; hep gtt to be initiated if INR <2.5  - Rate control: continue with coreg + dig     [phone]

## 2018-06-16 NOTE — PROGRESS NOTE ADULT - PROBLEM SELECTOR PLAN 5
-C/w Entresto, eplerenone, digoxin, and Coreg.  -C/w DASH diet. -C/w Entresto, eplerenone, digoxin, and Coreg.  -C/w low sodium diet.

## 2018-06-16 NOTE — PROGRESS NOTE ADULT - PROBLEM SELECTOR PLAN 2
-Patient paced at this time  -EKG was ordered.   -No events on telemetry. -Patient paced at this time.  -EKG paced.   -No events on telemetry. Now off telemetry.

## 2018-06-16 NOTE — PROGRESS NOTE ADULT - PROBLEM SELECTOR PLAN 3
-Patient on Daptomycin, ID consulted to help guidance in terms of duration as he has been on it since April  -Blood cultures testing.   -C/w daptomycin per ID recs.   -Check weekly CK levels, so far acceptable. -Patient on Daptomycin, ID consulted to help guidance in terms of duration as he has been on it since April.  -Blood cultures NGTD.   -C/w daptomycin per ID recs.   -Check weekly CK levels, so far acceptable.

## 2018-06-17 LAB
ANION GAP SERPL CALC-SCNC: 12 MMOL/L — SIGNIFICANT CHANGE UP (ref 5–17)
APTT BLD: 102.9 SEC — HIGH (ref 27.5–37.4)
APTT BLD: 64.6 SEC — HIGH (ref 27.5–37.4)
APTT BLD: 79.3 SEC — HIGH (ref 27.5–37.4)
BUN SERPL-MCNC: 19 MG/DL — SIGNIFICANT CHANGE UP (ref 7–23)
CALCIUM SERPL-MCNC: 8.5 MG/DL — SIGNIFICANT CHANGE UP (ref 8.4–10.5)
CHLORIDE SERPL-SCNC: 101 MMOL/L — SIGNIFICANT CHANGE UP (ref 96–108)
CO2 SERPL-SCNC: 25 MMOL/L — SIGNIFICANT CHANGE UP (ref 22–31)
CREAT SERPL-MCNC: 1.13 MG/DL — SIGNIFICANT CHANGE UP (ref 0.5–1.3)
GLUCOSE SERPL-MCNC: 90 MG/DL — SIGNIFICANT CHANGE UP (ref 70–99)
HCT VFR BLD CALC: 39.5 % — SIGNIFICANT CHANGE UP (ref 39–50)
HGB BLD-MCNC: 13.8 G/DL — SIGNIFICANT CHANGE UP (ref 13–17)
MCHC RBC-ENTMCNC: 31.6 PG — SIGNIFICANT CHANGE UP (ref 27–34)
MCHC RBC-ENTMCNC: 34.9 GM/DL — SIGNIFICANT CHANGE UP (ref 32–36)
MCV RBC AUTO: 90.4 FL — SIGNIFICANT CHANGE UP (ref 80–100)
PLATELET # BLD AUTO: 210 K/UL — SIGNIFICANT CHANGE UP (ref 150–400)
POTASSIUM SERPL-MCNC: 4.3 MMOL/L — SIGNIFICANT CHANGE UP (ref 3.5–5.3)
POTASSIUM SERPL-SCNC: 4.3 MMOL/L — SIGNIFICANT CHANGE UP (ref 3.5–5.3)
RBC # BLD: 4.37 M/UL — SIGNIFICANT CHANGE UP (ref 4.2–5.8)
RBC # FLD: 13.4 % — SIGNIFICANT CHANGE UP (ref 10.3–14.5)
SODIUM SERPL-SCNC: 138 MMOL/L — SIGNIFICANT CHANGE UP (ref 135–145)
WBC # BLD: 8.04 K/UL — SIGNIFICANT CHANGE UP (ref 3.8–10.5)
WBC # FLD AUTO: 8.04 K/UL — SIGNIFICANT CHANGE UP (ref 3.8–10.5)

## 2018-06-17 PROCEDURE — 99232 SBSQ HOSP IP/OBS MODERATE 35: CPT

## 2018-06-17 PROCEDURE — 99233 SBSQ HOSP IP/OBS HIGH 50: CPT

## 2018-06-17 RX ORDER — WARFARIN SODIUM 2.5 MG/1
5 TABLET ORAL ONCE
Qty: 0 | Refills: 0 | Status: DISCONTINUED | OUTPATIENT
Start: 2018-06-17 | End: 2018-06-17

## 2018-06-17 RX ORDER — WARFARIN SODIUM 2.5 MG/1
6 TABLET ORAL ONCE
Qty: 0 | Refills: 0 | Status: COMPLETED | OUTPATIENT
Start: 2018-06-17 | End: 2018-06-17

## 2018-06-17 RX ADMIN — HEPARIN SODIUM 800 UNIT(S)/HR: 5000 INJECTION INTRAVENOUS; SUBCUTANEOUS at 16:14

## 2018-06-17 RX ADMIN — Medication 1 TABLET(S): at 09:30

## 2018-06-17 RX ADMIN — WARFARIN SODIUM 6 MILLIGRAM(S): 2.5 TABLET ORAL at 21:54

## 2018-06-17 RX ADMIN — Medication 1000 UNIT(S): at 13:50

## 2018-06-17 RX ADMIN — CARVEDILOL PHOSPHATE 12.5 MILLIGRAM(S): 80 CAPSULE, EXTENDED RELEASE ORAL at 06:07

## 2018-06-17 RX ADMIN — HEPARIN SODIUM 800 UNIT(S)/HR: 5000 INJECTION INTRAVENOUS; SUBCUTANEOUS at 02:08

## 2018-06-17 RX ADMIN — CARVEDILOL PHOSPHATE 12.5 MILLIGRAM(S): 80 CAPSULE, EXTENDED RELEASE ORAL at 20:00

## 2018-06-17 RX ADMIN — TAMSULOSIN HYDROCHLORIDE 0.4 MILLIGRAM(S): 0.4 CAPSULE ORAL at 21:54

## 2018-06-17 RX ADMIN — DAPTOMYCIN 120 MILLIGRAM(S): 500 INJECTION, POWDER, LYOPHILIZED, FOR SOLUTION INTRAVENOUS at 16:50

## 2018-06-17 RX ADMIN — SACUBITRIL AND VALSARTAN 1 TABLET(S): 24; 26 TABLET, FILM COATED ORAL at 20:00

## 2018-06-17 RX ADMIN — HEPARIN SODIUM 800 UNIT(S)/HR: 5000 INJECTION INTRAVENOUS; SUBCUTANEOUS at 09:29

## 2018-06-17 RX ADMIN — SACUBITRIL AND VALSARTAN 1 TABLET(S): 24; 26 TABLET, FILM COATED ORAL at 06:07

## 2018-06-17 RX ADMIN — EPLERENONE 25 MILLIGRAM(S): 50 TABLET, FILM COATED ORAL at 06:07

## 2018-06-17 RX ADMIN — Medication 1 TABLET(S): at 16:50

## 2018-06-17 RX ADMIN — Medication 1 TABLET(S): at 13:51

## 2018-06-17 RX ADMIN — Medication 0.12 MILLIGRAM(S): at 06:08

## 2018-06-17 NOTE — PROGRESS NOTE ADULT - PROBLEM SELECTOR PLAN 4
-C/w Coreg and digoxin.  -INR today 1.91.   -C/w heparin drip since patient has mechanical MVR and now INR < 2.5.   -Will give coumadin 6mg at bedtime tonight, per EPS order.   -Monitor coags.

## 2018-06-17 NOTE — PROGRESS NOTE ADULT - PROBLEM SELECTOR PLAN 3
-Patient on Daptomycin, ID consulted to help guidance in terms of duration as he has been on it since April.  -Blood cultures NGTD.   -C/w daptomycin per ID recs.   -Check weekly CK levels, so far acceptable.

## 2018-06-17 NOTE — PROGRESS NOTE ADULT - ASSESSMENT
57 yo male with pmhx of Afib, TIAs, congential heart block s/p PPM 5/10/1990, Non-ischemic cardiomyopathy (Echo 4/19/18: EF 25%), s/p upgrade to CRT-D 4/13/2011 followed by generator change and RV lead revision 2/2/2015, Mitral regurgitation s/p mechanical MVR in 2000 (on Coumadin), BPH, who presents as direct admit for BiV ICD and lead extraction secondary to blood cultures with MSSA bacteremia. Pt was admitted in April of 2018 to [facility] where he was found to have persistent MSSA bacteremia and has been on Daptomycin since then. BiV ICD interrogated 6/14/18 revealed patient is pacemaker dependent. Now awaiting for coumadin washout for goal INR around 2.5.    # MSSA bacteremia    -Plan for ICD lead extraction and re-implant with temporary wire on Tuesday (6/19/18)  - c/w  antimicrobial therapy per ID recommendation    -f/u final blood culture result ; prelim NGTD   - AC: PLEASE KEEP  GOAL INR AROUND 2.5 FOR PROCEDURE ON TUESDAY;  today's INR result pending; hep gtt to be initiated if INR <2.5  - Rate control: continue with coreg + dig     [id number] prior to 1pm. [id number] after 1pm

## 2018-06-18 ENCOUNTER — TRANSCRIPTION ENCOUNTER (OUTPATIENT)
Age: 58
End: 2018-06-18

## 2018-06-18 LAB
APTT BLD: 68.9 SEC — HIGH (ref 27.5–37.4)
HCT VFR BLD CALC: 38.4 % — LOW (ref 39–50)
HGB BLD-MCNC: 13.5 G/DL — SIGNIFICANT CHANGE UP (ref 13–17)
INR BLD: 1.75 RATIO — HIGH (ref 0.88–1.16)
MCHC RBC-ENTMCNC: 32.4 PG — SIGNIFICANT CHANGE UP (ref 27–34)
MCHC RBC-ENTMCNC: 35.2 GM/DL — SIGNIFICANT CHANGE UP (ref 32–36)
MCV RBC AUTO: 92.1 FL — SIGNIFICANT CHANGE UP (ref 80–100)
PLATELET # BLD AUTO: 199 K/UL — SIGNIFICANT CHANGE UP (ref 150–400)
PROTHROM AB SERPL-ACNC: 20 SEC — HIGH (ref 10–13.1)
RBC # BLD: 4.17 M/UL — LOW (ref 4.2–5.8)
RBC # FLD: 13 % — SIGNIFICANT CHANGE UP (ref 10.3–14.5)
WBC # BLD: 8.24 K/UL — SIGNIFICANT CHANGE UP (ref 3.8–10.5)
WBC # FLD AUTO: 8.24 K/UL — SIGNIFICANT CHANGE UP (ref 3.8–10.5)

## 2018-06-18 PROCEDURE — 99232 SBSQ HOSP IP/OBS MODERATE 35: CPT

## 2018-06-18 PROCEDURE — 99233 SBSQ HOSP IP/OBS HIGH 50: CPT

## 2018-06-18 RX ORDER — WARFARIN SODIUM 2.5 MG/1
10 TABLET ORAL ONCE
Qty: 0 | Refills: 0 | Status: COMPLETED | OUTPATIENT
Start: 2018-06-18 | End: 2018-06-18

## 2018-06-18 RX ORDER — WARFARIN SODIUM 2.5 MG/1
10 TABLET ORAL ONCE
Qty: 0 | Refills: 0 | Status: DISCONTINUED | OUTPATIENT
Start: 2018-06-18 | End: 2018-06-18

## 2018-06-18 RX ADMIN — CARVEDILOL PHOSPHATE 12.5 MILLIGRAM(S): 80 CAPSULE, EXTENDED RELEASE ORAL at 17:42

## 2018-06-18 RX ADMIN — SACUBITRIL AND VALSARTAN 1 TABLET(S): 24; 26 TABLET, FILM COATED ORAL at 05:36

## 2018-06-18 RX ADMIN — SACUBITRIL AND VALSARTAN 1 TABLET(S): 24; 26 TABLET, FILM COATED ORAL at 17:42

## 2018-06-18 RX ADMIN — Medication 0.12 MILLIGRAM(S): at 05:36

## 2018-06-18 RX ADMIN — TAMSULOSIN HYDROCHLORIDE 0.4 MILLIGRAM(S): 0.4 CAPSULE ORAL at 21:44

## 2018-06-18 RX ADMIN — HEPARIN SODIUM 800 UNIT(S)/HR: 5000 INJECTION INTRAVENOUS; SUBCUTANEOUS at 08:31

## 2018-06-18 RX ADMIN — DAPTOMYCIN 120 MILLIGRAM(S): 500 INJECTION, POWDER, LYOPHILIZED, FOR SOLUTION INTRAVENOUS at 18:16

## 2018-06-18 RX ADMIN — Medication 1000 UNIT(S): at 11:48

## 2018-06-18 RX ADMIN — WARFARIN SODIUM 10 MILLIGRAM(S): 2.5 TABLET ORAL at 21:44

## 2018-06-18 RX ADMIN — Medication 1 TABLET(S): at 17:42

## 2018-06-18 RX ADMIN — Medication 1 TABLET(S): at 11:48

## 2018-06-18 RX ADMIN — CARVEDILOL PHOSPHATE 12.5 MILLIGRAM(S): 80 CAPSULE, EXTENDED RELEASE ORAL at 05:36

## 2018-06-18 RX ADMIN — Medication 1 TABLET(S): at 09:06

## 2018-06-18 RX ADMIN — EPLERENONE 25 MILLIGRAM(S): 50 TABLET, FILM COATED ORAL at 05:36

## 2018-06-18 NOTE — PROGRESS NOTE ADULT - PROBLEM SELECTOR PLAN 4
-C/w Coreg and digoxin.  -INR today 1.75.  -C/w heparin drip since patient has mechanical MVR and now INR < 2.5.   -Will give coumadin 10mg at bedtime tonight, per EPS recs.   -Monitor coags.

## 2018-06-18 NOTE — PROGRESS NOTE ADULT - ASSESSMENT
57 yo male with pmhx of Afib, TIAs, congential heart block s/p PPM 5/10/1990, Non-ischemic cardiomyopathy (Echo 4/19/18: EF 25%), s/p upgrade to CRT-D 4/13/2011 followed by generator change and RV lead revision 2/2/2015, Mitral regurgitation s/p mechanical MVR in 2000 (on Coumadin), BPH, who presents as direct admit for BiV ICD and lead extraction secondary to blood cultures with MSSA bacteremia. Pt was admitted in April of 2018 to [facility] where he was found to have persistent MSSA bacteremia and has been on Daptomycin since then. BiV ICD interrogated 6/14/18 revealed patient is pacemaker dependent.       # MSSA bacteremia  - c/w  antimicrobial therapy per ID recommendation    -f/u final blood culture result ; prelim NGTD   - AC: PLEASE KEEP  GOAL INR AROUND 2.5 FOR PROCEDURE ON TUESDAY;  -Please give Coumadin 10mg tonight, today's INR 1.75  -c/w Heparin drip and DISCONTINUE on 6/19/18 at 6am  -NPO after midnight for OR: ICD lead extraction and re-implant with temporary wire vs micra PPM tomorrow 6/19/18 at 11am.   -Plan d/w Dr. Guadalupe Robbins-Josephine, ANP  [id number]

## 2018-06-19 LAB
ANION GAP SERPL CALC-SCNC: 12 MMOL/L — SIGNIFICANT CHANGE UP (ref 5–17)
APTT BLD: 45.9 SEC — HIGH (ref 27.5–37.4)
BLD GP AB SCN SERPL QL: NEGATIVE — SIGNIFICANT CHANGE UP
BUN SERPL-MCNC: 18 MG/DL — SIGNIFICANT CHANGE UP (ref 7–23)
CALCIUM SERPL-MCNC: 9 MG/DL — SIGNIFICANT CHANGE UP (ref 8.4–10.5)
CHLORIDE SERPL-SCNC: 100 MMOL/L — SIGNIFICANT CHANGE UP (ref 96–108)
CO2 SERPL-SCNC: 23 MMOL/L — SIGNIFICANT CHANGE UP (ref 22–31)
CREAT SERPL-MCNC: 1.14 MG/DL — SIGNIFICANT CHANGE UP (ref 0.5–1.3)
CULTURE RESULTS: SIGNIFICANT CHANGE UP
CULTURE RESULTS: SIGNIFICANT CHANGE UP
GLUCOSE SERPL-MCNC: 88 MG/DL — SIGNIFICANT CHANGE UP (ref 70–99)
HCT VFR BLD CALC: 38.8 % — LOW (ref 39–50)
HGB BLD-MCNC: 12.8 G/DL — LOW (ref 13–17)
INR BLD: 2.29 RATIO — HIGH (ref 0.88–1.16)
MCHC RBC-ENTMCNC: 30.1 PG — SIGNIFICANT CHANGE UP (ref 27–34)
MCHC RBC-ENTMCNC: 33 GM/DL — SIGNIFICANT CHANGE UP (ref 32–36)
MCV RBC AUTO: 91.3 FL — SIGNIFICANT CHANGE UP (ref 80–100)
PLATELET # BLD AUTO: 190 K/UL — SIGNIFICANT CHANGE UP (ref 150–400)
POTASSIUM SERPL-MCNC: 4.2 MMOL/L — SIGNIFICANT CHANGE UP (ref 3.5–5.3)
POTASSIUM SERPL-SCNC: 4.2 MMOL/L — SIGNIFICANT CHANGE UP (ref 3.5–5.3)
PROTHROM AB SERPL-ACNC: 25.4 SEC — HIGH (ref 9.8–12.7)
RBC # BLD: 4.25 M/UL — SIGNIFICANT CHANGE UP (ref 4.2–5.8)
RBC # FLD: 13.1 % — SIGNIFICANT CHANGE UP (ref 10.3–14.5)
RH IG SCN BLD-IMP: POSITIVE — SIGNIFICANT CHANGE UP
SODIUM SERPL-SCNC: 135 MMOL/L — SIGNIFICANT CHANGE UP (ref 135–145)
SPECIMEN SOURCE: SIGNIFICANT CHANGE UP
SPECIMEN SOURCE: SIGNIFICANT CHANGE UP
WBC # BLD: 7.41 K/UL — SIGNIFICANT CHANGE UP (ref 3.8–10.5)
WBC # FLD AUTO: 7.41 K/UL — SIGNIFICANT CHANGE UP (ref 3.8–10.5)

## 2018-06-19 PROCEDURE — 33244 REMOVE ELCTRD TRANSVENOUSLY: CPT | Mod: 59

## 2018-06-19 PROCEDURE — 71045 X-RAY EXAM CHEST 1 VIEW: CPT | Mod: 26

## 2018-06-19 PROCEDURE — 33207 INSERT HEART PM VENTRICULAR: CPT | Mod: 59

## 2018-06-19 PROCEDURE — 99233 SBSQ HOSP IP/OBS HIGH 50: CPT

## 2018-06-19 PROCEDURE — 99232 SBSQ HOSP IP/OBS MODERATE 35: CPT

## 2018-06-19 PROCEDURE — 33241 REMOVE PULSE GENERATOR: CPT

## 2018-06-19 RX ORDER — DEXAMETHASONE 0.5 MG/5ML
4 ELIXIR ORAL ONCE
Qty: 0 | Refills: 0 | Status: COMPLETED | OUTPATIENT
Start: 2018-06-19 | End: 2018-06-19

## 2018-06-19 RX ORDER — HYDROMORPHONE HYDROCHLORIDE 2 MG/ML
0.25 INJECTION INTRAMUSCULAR; INTRAVENOUS; SUBCUTANEOUS
Qty: 0 | Refills: 0 | Status: DISCONTINUED | OUTPATIENT
Start: 2018-06-19 | End: 2018-06-20

## 2018-06-19 RX ORDER — ONDANSETRON 8 MG/1
4 TABLET, FILM COATED ORAL ONCE
Qty: 0 | Refills: 0 | Status: COMPLETED | OUTPATIENT
Start: 2018-06-19 | End: 2018-06-19

## 2018-06-19 RX ORDER — WARFARIN SODIUM 2.5 MG/1
8 TABLET ORAL AT BEDTIME
Qty: 0 | Refills: 0 | Status: DISCONTINUED | OUTPATIENT
Start: 2018-06-19 | End: 2018-06-20

## 2018-06-19 RX ADMIN — TAMSULOSIN HYDROCHLORIDE 0.4 MILLIGRAM(S): 0.4 CAPSULE ORAL at 23:10

## 2018-06-19 RX ADMIN — Medication 4 MILLIGRAM(S): at 22:20

## 2018-06-19 RX ADMIN — Medication 0.12 MILLIGRAM(S): at 06:21

## 2018-06-19 RX ADMIN — WARFARIN SODIUM 8 MILLIGRAM(S): 2.5 TABLET ORAL at 23:11

## 2018-06-19 RX ADMIN — ONDANSETRON 4 MILLIGRAM(S): 8 TABLET, FILM COATED ORAL at 21:00

## 2018-06-19 NOTE — PROGRESS NOTE ADULT - PROBLEM SELECTOR PLAN 2
-Patient paced at this time.  -EKG paced.   -No events on telemetry. Now off telemetry. Should resume telemetry after the procedure.

## 2018-06-19 NOTE — PROGRESS NOTE ADULT - PROBLEM SELECTOR PLAN 4
-C/w Coreg and digoxin.  -INR today 2.29.   -C/w heparin drip since patient has mechanical MVR and now INR < 2.5. Heparin drip on hold prior to procedure today. Will need heparin and coumadin after procedure.   -Will redose coumadin tonight after the procedure today.    -Monitor coags.

## 2018-06-19 NOTE — PROGRESS NOTE ADULT - PROBLEM SELECTOR PLAN 3
-Patient on Daptomycin, ID consulted to help guidance in terms of duration as he has been on it since April.  -Blood cultures NGTD.   -C/w daptomycin per ID recs.   -Check weekly CK levels, so far acceptable. Will check again tomorrow.

## 2018-06-19 NOTE — CHART NOTE - NSCHARTNOTEFT_GEN_A_CORE
BRIEF PROCEDURE NOTE    RYAN GONSALVES  [id number]    Pre-op Diagnosis: Endocarditis, CHB, CHF, DCM    Post-op Diagnosis: same    Procedure: ICD/Lead extraction, temp wire placement, Micra Placement    Electrophysiologist: Marisela Butcher MD    Assistant: Gerber Delgado MD    Anesthesia: GA    Access: R and LFV    Description:  12 Fr sheath RFV, wire to SVC  6Fr sheath LFV, temp wire to RVA  Left ICD pocket opened, ICD removed  Leads freed from scar in pocket, anhors removed, locking stylets placed  Newest ICD lead removed with traction  Older ICD lead, LV lead, V lead and A lead removed with 16Fr laser  Lead tips sent for culture  Venous entry oversewn, wound closed    Sheath in RFV upsized to 27Fr micra delivery sheath  Micra positioned in mid RV septum, tested and released  Sheath removed, hemostasis with figure of 8 suture  temp wire and sheath removed    Complications: none    EBL: 100cc    Disposition: to PACU/stable    Plan:  Bedrest tonight  remove stich in Am  8mg coumadin tonight and then back to normal dosing  no heparin/lovinox  continue antibiotics  CXR

## 2018-06-19 NOTE — PROGRESS NOTE ADULT - ASSESSMENT
57 yo male with pmhx of congential heart block s/p PPM in 1990 with upgrade to AICD in 2015, mitral valve replacement on Coumadin, HFrEF, BPH, afib, who presents for elective lead extraction for concern of infected AICD with staph infection hx with PICC in place and on long term datpomycin

## 2018-06-19 NOTE — PROGRESS NOTE ADULT - ASSESSMENT
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 Pt was admitted in April of 2018 to [facility] where he was found to have persistent MSSA bacteremia and has been on Daptomycin since then. BiV ICD interrogated 6/14/18 revealed patient is pacemaker dependent.     # MSSA bacteremia  -c/w  antimicrobial therapy per ID recommendation    -f/u final blood culture result sent 6/14/18 ;  prelim NGTD   - AC: INR 2.29 today; heparin gtt discontinued for procedure continue with coumadin dosing    - keep NPO  for OR later today at 11am : ICD lead extraction and re-implant with temporary wire vs micra PPM   - will need telemetry resumed post surgery.    [phone]

## 2018-06-20 ENCOUNTER — TRANSCRIPTION ENCOUNTER (OUTPATIENT)
Age: 58
End: 2018-06-20

## 2018-06-20 LAB
ANION GAP SERPL CALC-SCNC: 12 MMOL/L — SIGNIFICANT CHANGE UP (ref 5–17)
APTT BLD: 45.9 SEC — HIGH (ref 27.5–37.4)
BUN SERPL-MCNC: 19 MG/DL — SIGNIFICANT CHANGE UP (ref 7–23)
CALCIUM SERPL-MCNC: 8.7 MG/DL — SIGNIFICANT CHANGE UP (ref 8.4–10.5)
CHLORIDE SERPL-SCNC: 99 MMOL/L — SIGNIFICANT CHANGE UP (ref 96–108)
CK SERPL-CCNC: 182 U/L — SIGNIFICANT CHANGE UP (ref 30–200)
CO2 SERPL-SCNC: 25 MMOL/L — SIGNIFICANT CHANGE UP (ref 22–31)
CREAT SERPL-MCNC: 1.29 MG/DL — SIGNIFICANT CHANGE UP (ref 0.5–1.3)
GLUCOSE SERPL-MCNC: 157 MG/DL — HIGH (ref 70–99)
HCT VFR BLD CALC: 35 % — LOW (ref 39–50)
HGB BLD-MCNC: 11.6 G/DL — LOW (ref 13–17)
INR BLD: 3.74 RATIO — HIGH (ref 0.88–1.16)
MAGNESIUM SERPL-MCNC: 1.7 MG/DL — SIGNIFICANT CHANGE UP (ref 1.6–2.6)
MCHC RBC-ENTMCNC: 30.2 PG — SIGNIFICANT CHANGE UP (ref 27–34)
MCHC RBC-ENTMCNC: 33.1 GM/DL — SIGNIFICANT CHANGE UP (ref 32–36)
MCV RBC AUTO: 91.1 FL — SIGNIFICANT CHANGE UP (ref 80–100)
NIGHT BLUE STAIN TISS: SIGNIFICANT CHANGE UP
NIGHT BLUE STAIN TISS: SIGNIFICANT CHANGE UP
PHOSPHATE SERPL-MCNC: 3.3 MG/DL — SIGNIFICANT CHANGE UP (ref 2.5–4.5)
PLATELET # BLD AUTO: 164 K/UL — SIGNIFICANT CHANGE UP (ref 150–400)
POTASSIUM SERPL-MCNC: 4.7 MMOL/L — SIGNIFICANT CHANGE UP (ref 3.5–5.3)
POTASSIUM SERPL-SCNC: 4.7 MMOL/L — SIGNIFICANT CHANGE UP (ref 3.5–5.3)
PROTHROM AB SERPL-ACNC: 41.9 SEC — HIGH (ref 9.8–12.7)
RBC # BLD: 3.84 M/UL — LOW (ref 4.2–5.8)
RBC # FLD: 12.9 % — SIGNIFICANT CHANGE UP (ref 10.3–14.5)
SODIUM SERPL-SCNC: 136 MMOL/L — SIGNIFICANT CHANGE UP (ref 135–145)
SPECIMEN SOURCE: SIGNIFICANT CHANGE UP
SPECIMEN SOURCE: SIGNIFICANT CHANGE UP
WBC # BLD: 7.95 K/UL — SIGNIFICANT CHANGE UP (ref 3.8–10.5)
WBC # FLD AUTO: 7.95 K/UL — SIGNIFICANT CHANGE UP (ref 3.8–10.5)

## 2018-06-20 PROCEDURE — 99233 SBSQ HOSP IP/OBS HIGH 50: CPT

## 2018-06-20 PROCEDURE — 99232 SBSQ HOSP IP/OBS MODERATE 35: CPT

## 2018-06-20 PROCEDURE — 99024 POSTOP FOLLOW-UP VISIT: CPT

## 2018-06-20 RX ORDER — ACETAMINOPHEN 500 MG
650 TABLET ORAL EVERY 6 HOURS
Qty: 0 | Refills: 0 | Status: DISCONTINUED | OUTPATIENT
Start: 2018-06-20 | End: 2018-06-21

## 2018-06-20 RX ORDER — ACETAMINOPHEN 500 MG
650 TABLET ORAL ONCE
Qty: 0 | Refills: 0 | Status: DISCONTINUED | OUTPATIENT
Start: 2018-06-20 | End: 2018-06-21

## 2018-06-20 RX ORDER — WARFARIN SODIUM 2.5 MG/1
2 TABLET ORAL ONCE
Qty: 0 | Refills: 0 | Status: COMPLETED | OUTPATIENT
Start: 2018-06-20 | End: 2018-06-20

## 2018-06-20 RX ORDER — MAGNESIUM SULFATE 500 MG/ML
2 VIAL (ML) INJECTION ONCE
Qty: 0 | Refills: 0 | Status: COMPLETED | OUTPATIENT
Start: 2018-06-20 | End: 2018-06-20

## 2018-06-20 RX ORDER — ALTEPLASE 100 MG
2 KIT INTRAVENOUS ONCE
Qty: 0 | Refills: 0 | Status: COMPLETED | OUTPATIENT
Start: 2018-06-20 | End: 2018-06-20

## 2018-06-20 RX ADMIN — Medication 50 GRAM(S): at 15:52

## 2018-06-20 RX ADMIN — DAPTOMYCIN 120 MILLIGRAM(S): 500 INJECTION, POWDER, LYOPHILIZED, FOR SOLUTION INTRAVENOUS at 18:00

## 2018-06-20 RX ADMIN — Medication 1000 UNIT(S): at 12:18

## 2018-06-20 RX ADMIN — Medication 1 TABLET(S): at 18:04

## 2018-06-20 RX ADMIN — EPLERENONE 25 MILLIGRAM(S): 50 TABLET, FILM COATED ORAL at 06:42

## 2018-06-20 RX ADMIN — TAMSULOSIN HYDROCHLORIDE 0.4 MILLIGRAM(S): 0.4 CAPSULE ORAL at 21:56

## 2018-06-20 RX ADMIN — SACUBITRIL AND VALSARTAN 1 TABLET(S): 24; 26 TABLET, FILM COATED ORAL at 06:41

## 2018-06-20 RX ADMIN — WARFARIN SODIUM 2 MILLIGRAM(S): 2.5 TABLET ORAL at 21:56

## 2018-06-20 RX ADMIN — Medication 1 TABLET(S): at 13:18

## 2018-06-20 RX ADMIN — ALTEPLASE 2 MILLIGRAM(S): KIT at 10:28

## 2018-06-20 RX ADMIN — Medication 0.12 MILLIGRAM(S): at 06:40

## 2018-06-20 RX ADMIN — CARVEDILOL PHOSPHATE 12.5 MILLIGRAM(S): 80 CAPSULE, EXTENDED RELEASE ORAL at 06:41

## 2018-06-20 RX ADMIN — Medication 1 TABLET(S): at 12:19

## 2018-06-20 NOTE — DISCHARGE NOTE ADULT - HOSPITAL COURSE
58 year old male with PMH of congenital heart block, HFrEF (25%), Afib, mechanical MVR; presented for planned lead extraction in the setting of previous MSSA bacteremia.       Problem/Plan - 1:  ·  Problem: Infection of implantable cardioverter-defibrillator (ICD) lead, initial encounter.  Plan: -Patient presented for elective lead extraction as there was concern that his MSSA bacteremia was from his infected device.  -S/p extraction of device with implantation of Micra PPM on 6/19/18.  -4 more weeks of abx and then patient to come back for BiV ICD in a month, per discussion with EP and ID.   Patient to get LifeVest prior to DC, fitted   -Cultures from OR no growth to date.  -Post-procedure CXR without PTX.      Problem/Plan - 2:  ·  Problem: MSSA bacteremia.  Plan: -Patient on Daptomycin, ID consulted to help guidance in terms of duration as he has been on it since April.  -Blood cultures NGTD.   -C/w daptomycin per ID recs. Continue for 4 weeks per ID. Patient to resume with his ID doctor in [place] at his infusion center this Friday if possible.   -Check weekly CK levels, so far acceptable.      Problem/Plan - 4:  ·  Problem: Atrial fibrillation.  Plan: -C/w Coreg and digoxin and coumadin  -Monitor coags. Patient says his INR outpatient goal by his cardiologist is usually ~3.5-4.      Problem/Plan - 5:  ·  Problem: Chronic systolic (congestive) heart failure.  Plan: -C/w Entresto, eplerenone, digoxin, and Coreg.  -C/w low sodium diet.      Problem/Plan - 6:  Problem: BPH (benign prostatic hyperplasia). Plan: -C/w Flomax.     Problem/Plan - 7:  ·  Problem: Need for prophylactic measure.  Plan: -Therapeutic on coumadin which covers DVT PPx.      Problem/Plan - 8:  ·  Problem: Nutrition, metabolism, and development symptoms.  Plan: -Low sodium diet.    9. Dispo: -DC home. Patient should have his INR checked on Friday or Saturday and follow up with his outpatient doctors for coumadin dosing with outpatient f/u 58 year old male with PMH of congenital heart block, HFrEF (25%), Afib, mechanical MVR; presented for planned lead extraction in the setting of previous MSSA bacteremia.       Problem/Plan - 1:  ·  Problem: Infection of implantable cardioverter-defibrillator (ICD) lead, initial encounter.  Plan: -Patient presented for elective lead extraction as there was concern that his MSSA bacteremia was from his infected device.  -S/p extraction of device with implantation of Micra PPM on 6/19/18.  -4 more weeks of abx and then patient to come back for BiV ICD in a month, per discussion with EP and ID.   Patient to get LifeVest prior to DC, fitted   -Cultures from OR no growth to date.  -Post-procedure CXR without PTX.      Problem/Plan - 2:  ·  Problem: MSSA bacteremia.  Plan: -Patient on Daptomycin, ID consulted to help guidance in terms of duration as he has been on it since April.  -Blood cultures NGTD.   -C/w daptomycin per ID recs. Continue for 4 weeks per ID. Patient to resume with his ID doctor in [place] at his infusion center this Friday if possible.   -Check weekly CK levels, so far acceptable.      Problem/Plan - 4:  ·  Problem: Atrial fibrillation.  Plan: -C/w Coreg and digoxin and coumadin  -Monitor coags. Patient says his INR outpatient goal by his cardiologist is usually ~3.5-4.   d/c on coumadin 4.5mg to be started on 6/23/18, hold dose for 6/21/18 and 6/22/18     Problem/Plan - 5:  ·  Problem: Chronic systolic (congestive) heart failure.  Plan: -C/w Entresto, eplerenone, digoxin, and Coreg.  -C/w low sodium diet.      Problem/Plan - 6:  Problem: BPH (benign prostatic hyperplasia). Plan: -C/w Flomax.     Problem/Plan - 7:  ·  Problem: Need for prophylactic measure.  Plan: -Therapeutic on coumadin which covers DVT PPx.      Problem/Plan - 8:  ·  Problem: Nutrition, metabolism, and development symptoms.  Plan: -Low sodium diet.    9. Dispo: -DC home. Patient should have his INR checked on Friday or Saturday and follow up with his outpatient doctors for coumadin dosing with outpatient f/u with Dr. Colon for wound and device check

## 2018-06-20 NOTE — DISCHARGE NOTE ADULT - CARE PLAN
Principal Discharge DX:	Chronic systolic (congestive) heart failure  Goal:	take medication as directed, prevent re-admission  Assessment and plan of treatment:	Weigh yourself daily.  If you gain 3lbs in 3 days, or 5lbs in a week call your Health Care Provider.  Do not eat or drink foods containing more than 2000mg of salt (sodium) in your diet every day.  Call your Health Care Provider if you have any swelling or increased swelling in your feet, ankles, and/or stomach.  Take all of your medication as directed.  If you become dizzy call your Health Care Provider.  Secondary Diagnosis:	Infection of implantable cardioverter-defibrillator (ICD) lead, initial encounter  Assessment and plan of treatment:	s/p extraction with micra pacemaker insertion 6/19/18  Secondary Diagnosis:	MSSA bacteremia  Assessment and plan of treatment:	Continue with Antibiotics as directed  Weekly CPK check (blood-work)  Follow-up with your Infectious Disease Doctor  Secondary Diagnosis:	Congenital heart block  Assessment and plan of treatment:	s/p micra PPM insertion on 6/19/18  Secondary Diagnosis:	Atrial fibrillation  Assessment and plan of treatment:	Atrial fibrillation is the most common heart rhythm problem.  The condition puts you at risk for has stroke and heart attack  It helps if you control your blood pressure, not drink more than 1-2 alcohol drinks per day, cut down on caffeine, getting treatment for over active thyroid gland, and get regular exercise  Call your doctor if you feel your heart racing or beating unusually, chest tightness or pain, lightheaded, faint, shortness of breath especially with exercise  It is important to take your heart medication as prescribed  You may be on anticoagulation which is very important to take as directed - you may need blood work to monitor drug levels  Secondary Diagnosis:	BPH (benign prostatic hyperplasia)  Assessment and plan of treatment:	You have an enlarged prostate gland which gets bigger as men get older - it is a very common problem and has nothing to do with prostate cancer  Call your doctor if you are urinating more frequently, have trouble starting to urinate, have weak stream, urine leaking or dribbling, and feeling as though bladder is not empty after urination  Your doctor will monitor your prostate with a rectal exam as well as urine or blood testing  You can help yourself by reducing the amount of fluid you drink before going to bed, limiting the amount of alcohol & caffeine you drink   Avoid cold & allergy medication that contain decongestants or antihistamines which make BPH symptoms worse  You can also "double void" by waiting a moment after urinating & trying again  Take your medication as prescribed - one medication helps to relax the muscle around the urethra and the other medication you may take prevents the prostate from growing more or even shrinking the prostate

## 2018-06-20 NOTE — PROGRESS NOTE ADULT - PROBLEM SELECTOR PLAN 2
-Telemetry with Vpacing 60-70's. Patient had 6 beats of WCT overnight.  -Hypomagnesemia repleted with IV Mag Sulfate.

## 2018-06-20 NOTE — PROGRESS NOTE ADULT - NEGATIVE CARDIOVASCULAR SYMPTOMS
no chest pain/no palpitations
no palpitations/no chest pain

## 2018-06-20 NOTE — PROGRESS NOTE ADULT - NEGATIVE GENERAL GENITOURINARY SYMPTOMS
no hematuria/no dysuria
no dysuria/no hematuria
no dysuria/no hematuria
no hematuria/no dysuria
no hematuria/no dysuria

## 2018-06-20 NOTE — PROGRESS NOTE ADULT - PROBLEM SELECTOR PLAN 3
-Patient on Daptomycin, ID consulted to help guidance in terms of duration as he has been on it since April.  -Blood cultures NGTD.   -C/w daptomycin per ID recs. Continue for 4 weeks per ID. Patient to resume with his ID doctor in [place] at his infusion center this Friday if possible.   -Check weekly CK levels, so far acceptable.  today and acceptable.

## 2018-06-20 NOTE — PROGRESS NOTE ADULT - NEGATIVE GENERAL SYMPTOMS
no fever/no chills/no malaise
no malaise/no fever/no chills
no malaise/no fever/no chills
no chills/no fever/no malaise
no fever/no chills/no malaise

## 2018-06-20 NOTE — DISCHARGE NOTE ADULT - CARE PROVIDER_API CALL
Marisela Butcher (MD), Cardiac Electrophysiology; Cardiovascular Disease; Internal Medicine  [address] [place] [address]  Phone: [phone]  Fax: [phone] Marisela Butcher (MD), Cardiac Electrophysiology; Cardiovascular Disease; Internal Medicine  [address] [place] [address]  Phone: [phone]  Fax: [phone]

## 2018-06-20 NOTE — PROGRESS NOTE ADULT - GASTROINTESTINAL DETAILS
soft/nontender/no rebound tenderness/no distention
soft/no distention/no rebound tenderness/nontender
no rebound tenderness/soft/nontender/no distention
nontender/no rebound tenderness/soft/no distention

## 2018-06-20 NOTE — DISCHARGE NOTE ADULT - SECONDARY DIAGNOSIS.
Congenital heart block Atrial fibrillation BPH (benign prostatic hyperplasia) Infection of implantable cardioverter-defibrillator (ICD) lead, initial encounter MSSA bacteremia

## 2018-06-20 NOTE — DISCHARGE NOTE ADULT - ADDITIONAL INSTRUCTIONS
Follow-up with your Primary Care Doctor within one week  Follow-up with your Cardiologist within one week  Please have your INR checked within 2-3 days of discharge Follow-up with your Primary Care Doctor within one week  Follow-up with your Cardiologist within one week  Please have your INR checked within 2-3 days of discharge (HOLD COUMADIN DOSE TONIGHT 6/21/18)  Follow-up with EP Follow-up with your Primary Care Doctor within one week  Follow-up with your Cardiologist within one week  Please have your INR checked within 2-3 days of discharge (HOLD COUMADIN DOSE TONIGHT 6/21/18 and 6/22/18)  Take Coumadin 4.5mg started on 6/23/18 at bedtime  Follow-up with EP (Dr. Forrester) Follow-up with your Primary Care Doctor within one week  Follow-up with your Cardiologist within one week  Please have your INR checked within 2-3 days of discharge (HOLD COUMADIN DOSE TONIGHT 6/21/18 and 6/22/18)  Take Coumadin 4.5mg started on 6/23/18 at bedtime  Follow-up with EP (Dr. Forrester)  Follow-up with your Infectious disease doctor within one week and have CPK (blood-work done weekly)

## 2018-06-20 NOTE — DISCHARGE NOTE ADULT - MEDICATION SUMMARY - MEDICATIONS TO TAKE
I will START or STAY ON the medications listed below when I get home from the hospital:    eplerenone 25 mg oral tablet  -- 1 tab(s) by mouth once a day  -- Indication: For Chronic systolic (congestive) heart failure    Entresto 49 mg-51 mg oral tablet  -- 1 tab(s) by mouth 2 times a day  -- Indication: For Chronic systolic (congestive) heart failure    tamsulosin 0.4 mg oral capsule  -- 1 cap(s) by mouth once a day  -- Indication: For BPH (benign prostatic hyperplasia)    digoxin 125 mcg (0.125 mg) oral tablet  -- 1 tab(s) by mouth once a day  -- Indication: For Afib    Coumadin 4 mg oral tablet  -- 4.5  by mouth once a day (at bedtime)   to be started on 6/23/18  -- Do not take this drug if you are pregnant.  It is very important that you take or use this exactly as directed.  Do not skip doses or discontinue unless directed by your doctor.  Obtain medical advice before taking any non-prescription drugs as some may affect the action of this medication.    -- Indication: For Afib    carvedilol 12.5 mg oral tablet  -- 1 tab(s) by mouth 2 times a day  -- Indication: For Blood pressure    DAPTOmycin 500 mg intravenous injection  -- intravenous once a day  -- Indication: For MSSA bacteremia    Probiotic Formula oral capsule  -- 1 cap(s) by mouth once a day  -- Indication: For Probiotic    Centrum oral tablet  -- 1 tab(s) by mouth once a day  -- Indication: For vitamin    Vitamin C 1000 mg oral tablet  -- 1 tab(s) by mouth once a day  -- Indication: For vitamin    Vitamin D3 1000 intl units oral tablet  -- 1 tab(s) by mouth once a day  -- Indication: For vitamin

## 2018-06-20 NOTE — PROGRESS NOTE ADULT - NEGATIVE ENMT SYMPTOMS
no nasal congestion/no throat pain
no nasal congestion/no throat pain
no throat pain/no nasal congestion

## 2018-06-20 NOTE — CHART NOTE - NSCHARTNOTEFT_GEN_A_CORE
Medicine NP    Notified by RN patient w/ bleeding to LT groin site. Assessed patient at bedside, AOx3, no acute distress. Patient Medicine NP    Notified by RN patient w/ bleeding to LT groin site. Assessed patient at bedside, AOx3, no acute distress. Patient endorsed he did not notice LT groin site bleeding until this evening. Denies pain. RT groin site CDI, LT groin site with dried, bright red blood to saturated gauze. No active bleeding noted. Ecchymoses noted to both sites w/o hematoma or swelling. Bleeding in setting of elevated INR (3.74) on coumadin and activity induced. Advised patient to limit ambulation overnight and reinforced dressing. H/H, plts stable. VSS. Continue to monitor overnight, f/u primary team in AM.    Violetta Flores, [facility]  [id number]

## 2018-06-20 NOTE — PROGRESS NOTE ADULT - NEUROLOGICAL DETAILS
responds to verbal commands/normal strength/alert and oriented x 3
normal strength/responds to verbal commands/alert and oriented x 3
responds to verbal commands/normal strength/alert and oriented x 3
alert and oriented x 3/responds to verbal commands/normal strength

## 2018-06-20 NOTE — PROGRESS NOTE ADULT - NEGATIVE NEUROLOGICAL SYMPTOMS
no weakness/no headache/no confusion
no headache/no confusion/no weakness
no weakness/no headache/no confusion
no weakness/no headache/no confusion
no weakness/no confusion/no headache

## 2018-06-20 NOTE — PROGRESS NOTE ADULT - RS GEN PE MLT RESP DETAILS PC
clear to auscultation bilaterally/respirations non-labored
respirations non-labored/clear to auscultation bilaterally
clear to auscultation bilaterally/respirations non-labored
respirations non-labored/clear to auscultation bilaterally

## 2018-06-20 NOTE — PROGRESS NOTE ADULT - ASSESSMENT
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 Pt was admitted in April of 2018 to [facility] where he was found to have persistent MSSA bacteremia and has been on Daptomycin since then. BiV ICD interrogated 6/14/18 revealed patient is pacemaker dependent.   # MSSA bacteremia    - s/p ICD extraction and placement of leadless PPM 6/19/18;   -Patient tolerated procedure well  - Post procedure Cxr result pending   - Device checked by MDT rep and reveals normal function.   - Right groin stitches removed; site benign, continue to monitor.   -Post PPM teaching reviewed with patient.    -Patient advised that if  fever or swelling/redness/discharge from wound, he should call his Private EP immediately  -c/w  antimicrobial therapy per ID recommendation    -f/u final blood culture result sent 6/14/18 ;  prelim NGTD   - AC: INR 3.7 today; continue with coumadin dosing  - will need telemetry resumed post surgery.    [id number].      	  - 57 yo male with pmhx of Afib, TIAs, congential heart block s/p PPM 5/10/1990, Non-ischemic cardiomyopathy (Echo 4/19/18: EF 25%), s/p upgrade to CRT-D 4/13/2011 followed by generator change and RV lead revision 2/2/2015, Mitral regurgitation s/p mechanical MVR in 2000 (on Coumadin), BPH, who presents as direct admit for BiV ICD and lead extraction secondary to blood cultures with MSSA bacteremia. Pt was admitted in April of 2018 to [facility] where he was found to have persistent MSSA bacteremia and has been on Daptomycin since then. BiV ICD interrogated 6/14/18 revealed patient is pacemaker dependent.   # MSSA bacteremia    - s/p ICD extraction and placement of leadless PPM 6/19/18;   -Patient tolerated procedure well  - Post procedure Cxr result pending   - Device checked by MDT rep and reveals normal function.   - Right groin stitches removed; site benign, continue to monitor.   -Post PPM teaching reviewed with patient.    -Patient advised that if  fever or swelling/redness/discharge from wound, he should call his Private EP immediately  -c/w  antimicrobial therapy per ID recommendation    -f/u final blood culture result sent 6/14/18 ;  prelim NGTD   - AC: INR 3.7 today; continue with coumadin dosing  - April Han called for life vest evaluation.     [id number].      	  -

## 2018-06-20 NOTE — PROGRESS NOTE ADULT - NEGATIVE MUSCULOSKELETAL SYMPTOMS
no arthralgia/no back pain
no arthralgia/no back pain
no back pain/no arthralgia

## 2018-06-20 NOTE — PROGRESS NOTE ADULT - ADDITIONAL PE
Rt UE PICC-no redness/swelling/tenderness
Rt UE PICC-no redness/swelling/tenderness    Lt CW dressing+
Rt UE PICC-no redness/swelling/tenderness    Left CW AICD-no redness/swelling/tenderness

## 2018-06-20 NOTE — DISCHARGE NOTE ADULT - PLAN OF CARE
Continue with Antibiotics as directed  Weekly CPK check (blood-work)  Follow-up with your Infectious Disease Doctor s/p micra PPM insertion on 6/19/18 Atrial fibrillation is the most common heart rhythm problem.  The condition puts you at risk for has stroke and heart attack  It helps if you control your blood pressure, not drink more than 1-2 alcohol drinks per day, cut down on caffeine, getting treatment for over active thyroid gland, and get regular exercise  Call your doctor if you feel your heart racing or beating unusually, chest tightness or pain, lightheaded, faint, shortness of breath especially with exercise  It is important to take your heart medication as prescribed  You may be on anticoagulation which is very important to take as directed - you may need blood work to monitor drug levels You have an enlarged prostate gland which gets bigger as men get older - it is a very common problem and has nothing to do with prostate cancer  Call your doctor if you are urinating more frequently, have trouble starting to urinate, have weak stream, urine leaking or dribbling, and feeling as though bladder is not empty after urination  Your doctor will monitor your prostate with a rectal exam as well as urine or blood testing  You can help yourself by reducing the amount of fluid you drink before going to bed, limiting the amount of alcohol & caffeine you drink   Avoid cold & allergy medication that contain decongestants or antihistamines which make BPH symptoms worse  You can also "double void" by waiting a moment after urinating & trying again  Take your medication as prescribed - one medication helps to relax the muscle around the urethra and the other medication you may take prevents the prostate from growing more or even shrinking the prostate take medication as directed, prevent re-admission Weigh yourself daily.  If you gain 3lbs in 3 days, or 5lbs in a week call your Health Care Provider.  Do not eat or drink foods containing more than 2000mg of salt (sodium) in your diet every day.  Call your Health Care Provider if you have any swelling or increased swelling in your feet, ankles, and/or stomach.  Take all of your medication as directed.  If you become dizzy call your Health Care Provider. s/p extraction with micra pacemaker insertion 6/19/18

## 2018-06-20 NOTE — PROGRESS NOTE ADULT - NEGATIVE OPHTHALMOLOGIC SYMPTOMS
no blurred vision L/no blurred vision R
no blurred vision R/no blurred vision L
no blurred vision R/no blurred vision L

## 2018-06-20 NOTE — PROGRESS NOTE ADULT - NEGATIVE GASTROINTESTINAL SYMPTOMS
no nausea/no diarrhea/no vomiting/no abdominal pain
no nausea/no diarrhea/no vomiting/no abdominal pain
no nausea/no vomiting/no abdominal pain/no diarrhea
no diarrhea/no vomiting/no abdominal pain/no nausea
no abdominal pain/no nausea/no diarrhea/no vomiting

## 2018-06-20 NOTE — PROGRESS NOTE ADULT - MS EXT PE MLT D E PC
no cyanosis/no pedal edema
no pedal edema/no cyanosis

## 2018-06-20 NOTE — DISCHARGE NOTE ADULT - MEDICATION SUMMARY - MEDICATIONS TO CHANGE
I will SWITCH the dose or number of times a day I take the medications listed below when I get home from the hospital:    Coumadin 6 mg oral tablet  -- 1 tab(s) by mouth once a day

## 2018-06-21 VITALS
HEART RATE: 68 BPM | DIASTOLIC BLOOD PRESSURE: 55 MMHG | SYSTOLIC BLOOD PRESSURE: 93 MMHG | TEMPERATURE: 98 F | OXYGEN SATURATION: 96 % | RESPIRATION RATE: 18 BRPM

## 2018-06-21 LAB
ANION GAP SERPL CALC-SCNC: 10 MMOL/L — SIGNIFICANT CHANGE UP (ref 5–17)
BUN SERPL-MCNC: 18 MG/DL — SIGNIFICANT CHANGE UP (ref 7–23)
CALCIUM SERPL-MCNC: 8.2 MG/DL — LOW (ref 8.4–10.5)
CHLORIDE SERPL-SCNC: 101 MMOL/L — SIGNIFICANT CHANGE UP (ref 96–108)
CO2 SERPL-SCNC: 26 MMOL/L — SIGNIFICANT CHANGE UP (ref 22–31)
CREAT SERPL-MCNC: 1.13 MG/DL — SIGNIFICANT CHANGE UP (ref 0.5–1.3)
GLUCOSE SERPL-MCNC: 91 MG/DL — SIGNIFICANT CHANGE UP (ref 70–99)
HCT VFR BLD CALC: 31.2 % — LOW (ref 39–50)
HGB BLD-MCNC: 10.3 G/DL — LOW (ref 13–17)
INR BLD: 4.9 RATIO — HIGH (ref 0.88–1.16)
MAGNESIUM SERPL-MCNC: 1.8 MG/DL — SIGNIFICANT CHANGE UP (ref 1.6–2.6)
MCHC RBC-ENTMCNC: 30.3 PG — SIGNIFICANT CHANGE UP (ref 27–34)
MCHC RBC-ENTMCNC: 33 GM/DL — SIGNIFICANT CHANGE UP (ref 32–36)
MCV RBC AUTO: 91.8 FL — SIGNIFICANT CHANGE UP (ref 80–100)
PLATELET # BLD AUTO: 144 K/UL — LOW (ref 150–400)
POTASSIUM SERPL-MCNC: 4.3 MMOL/L — SIGNIFICANT CHANGE UP (ref 3.5–5.3)
POTASSIUM SERPL-SCNC: 4.3 MMOL/L — SIGNIFICANT CHANGE UP (ref 3.5–5.3)
PROTHROM AB SERPL-ACNC: 57.2 SEC — HIGH (ref 10–13.1)
RBC # BLD: 3.4 M/UL — LOW (ref 4.2–5.8)
RBC # FLD: 13.3 % — SIGNIFICANT CHANGE UP (ref 10.3–14.5)
SODIUM SERPL-SCNC: 137 MMOL/L — SIGNIFICANT CHANGE UP (ref 135–145)
WBC # BLD: 10.77 K/UL — HIGH (ref 3.8–10.5)
WBC # FLD AUTO: 10.77 K/UL — HIGH (ref 3.8–10.5)

## 2018-06-21 PROCEDURE — 86900 BLOOD TYPING SEROLOGIC ABO: CPT

## 2018-06-21 PROCEDURE — P9016: CPT

## 2018-06-21 PROCEDURE — 86850 RBC ANTIBODY SCREEN: CPT

## 2018-06-21 PROCEDURE — 87040 BLOOD CULTURE FOR BACTERIA: CPT

## 2018-06-21 PROCEDURE — C2628: CPT

## 2018-06-21 PROCEDURE — 87116 MYCOBACTERIA CULTURE: CPT

## 2018-06-21 PROCEDURE — P9047: CPT

## 2018-06-21 PROCEDURE — 83735 ASSAY OF MAGNESIUM: CPT

## 2018-06-21 PROCEDURE — 80053 COMPREHEN METABOLIC PANEL: CPT

## 2018-06-21 PROCEDURE — 87075 CULTR BACTERIA EXCEPT BLOOD: CPT

## 2018-06-21 PROCEDURE — 76000 FLUOROSCOPY <1 HR PHYS/QHP: CPT

## 2018-06-21 PROCEDURE — C1730: CPT

## 2018-06-21 PROCEDURE — 87206 SMEAR FLUORESCENT/ACID STAI: CPT

## 2018-06-21 PROCEDURE — 86901 BLOOD TYPING SEROLOGIC RH(D): CPT

## 2018-06-21 PROCEDURE — C2629: CPT

## 2018-06-21 PROCEDURE — 93005 ELECTROCARDIOGRAM TRACING: CPT

## 2018-06-21 PROCEDURE — 99239 HOSP IP/OBS DSCHRG MGMT >30: CPT

## 2018-06-21 PROCEDURE — 71045 X-RAY EXAM CHEST 1 VIEW: CPT

## 2018-06-21 PROCEDURE — 82550 ASSAY OF CK (CPK): CPT

## 2018-06-21 PROCEDURE — 87070 CULTURE OTHR SPECIMN AEROBIC: CPT

## 2018-06-21 PROCEDURE — 87102 FUNGUS ISOLATION CULTURE: CPT

## 2018-06-21 PROCEDURE — 99024 POSTOP FOLLOW-UP VISIT: CPT

## 2018-06-21 PROCEDURE — 85027 COMPLETE CBC AUTOMATED: CPT

## 2018-06-21 PROCEDURE — 86923 COMPATIBILITY TEST ELECTRIC: CPT

## 2018-06-21 PROCEDURE — 85730 THROMBOPLASTIN TIME PARTIAL: CPT

## 2018-06-21 PROCEDURE — C1713: CPT

## 2018-06-21 PROCEDURE — C1786: CPT

## 2018-06-21 PROCEDURE — C1769: CPT

## 2018-06-21 PROCEDURE — 80048 BASIC METABOLIC PNL TOTAL CA: CPT

## 2018-06-21 PROCEDURE — C1773: CPT

## 2018-06-21 PROCEDURE — 84100 ASSAY OF PHOSPHORUS: CPT

## 2018-06-21 PROCEDURE — 85610 PROTHROMBIN TIME: CPT

## 2018-06-21 RX ORDER — DAPTOMYCIN 500 MG/10ML
0 INJECTION, POWDER, LYOPHILIZED, FOR SOLUTION INTRAVENOUS
Qty: 0 | Refills: 0 | COMMUNITY
Start: 2018-06-21

## 2018-06-21 RX ORDER — ASPIRIN/CALCIUM CARB/MAGNESIUM 324 MG
1 TABLET ORAL
Qty: 0 | Refills: 0 | COMMUNITY

## 2018-06-21 RX ORDER — WARFARIN SODIUM 2.5 MG/1
1 TABLET ORAL
Qty: 0 | Refills: 0 | COMMUNITY

## 2018-06-21 RX ADMIN — EPLERENONE 25 MILLIGRAM(S): 50 TABLET, FILM COATED ORAL at 05:19

## 2018-06-21 RX ADMIN — Medication 1 TABLET(S): at 12:48

## 2018-06-21 RX ADMIN — Medication 0.12 MILLIGRAM(S): at 05:19

## 2018-06-21 RX ADMIN — Medication 1000 UNIT(S): at 12:48

## 2018-06-21 RX ADMIN — Medication 1 TABLET(S): at 09:20

## 2018-06-21 RX ADMIN — DAPTOMYCIN 120 MILLIGRAM(S): 500 INJECTION, POWDER, LYOPHILIZED, FOR SOLUTION INTRAVENOUS at 12:48

## 2018-06-21 RX ADMIN — CARVEDILOL PHOSPHATE 12.5 MILLIGRAM(S): 80 CAPSULE, EXTENDED RELEASE ORAL at 05:19

## 2018-06-21 RX ADMIN — SACUBITRIL AND VALSARTAN 1 TABLET(S): 24; 26 TABLET, FILM COATED ORAL at 05:19

## 2018-06-21 NOTE — PROGRESS NOTE ADULT - NSHPATTENDINGPLANDISCUSS_GEN_ALL_CORE
Dr Butcher
Dr Butcher
ID
ALEXIA NP, and EDGARDO Yen
Dr. Alegria and Dr. Butcher, EDGARDO Moffett, patient and his family at bedside
EDGARDO Fox
EDGARDO Fox, patient and his wife at bedside
EDGARDO Moffett and patient and his family at bedside
EP NP, and EDGARDO Fox, and patient and his wife at bedside
pt
pt + wife & Dr. Butcher and Dr. Emerson
pt
pt + wife

## 2018-06-21 NOTE — PROGRESS NOTE ADULT - PROBLEM SELECTOR PLAN 1
-Patient presented for elective lead extraction as there is concern that his MSSA bacteremia was from his infected device.  -EP plans tentative extraction this Tuesday.
-Patient presented for elective lead extraction as there is concern that his MSSA bacteremia was from his infected device.  -EP plans for extraction today.   -NPO now and heparin drip has been on hold since 6am, patient showered today.  -F/u EPS recs after the procedure.
-Patient presented for elective lead extraction as there is concern that his MSSA bacteremia was from his infected device.  -EP plans for extraction tomorrow.  -NPO at midnight, stop heparin drip at 6am tomorrow, patient can shower prior.
-Patient presented for elective lead extraction as there is concern that his MSSA bacteremia was from his infected device.  -EP plans tentative extraction this Tuesday once INR around 2.5.
-Patient presented for elective lead extraction as there is concern that his MSSA bacteremia was from his infected device.  -EP plans tentative extraction this Tuesday.
-Patient presented for elective lead extraction as there was concern that his MSSA bacteremia was from his infected device.  -S/p extraction of device with implantation of Micra PPM on 6/19/18.  -4 more weeks of abx and then patient to come back for BiV ICD in a month, per discussion with EP and ID.   -C/w telemetry for now; no events seen overnight.   -Patient got LifeVest today.  -Cultures from OR no growth to date.  -Post-procedure CXR without PTX.  -Examined his groin procedure sites and re-bandaged with nurses. Advised him on basic bandaging and to follow up with his doctor next week. Also, discussed with him ways to avoid bleeding and what to do if he has bleeding.
-Patient presented for elective lead extraction as there was concern that his MSSA bacteremia was from his infected device.  -S/p extraction of device yesterday with implantation of Micra PPM on 6/19/18.  -4 more weeks of abx and then patient to come back for BiV ICD in a month, per discussion with EP and ID.   -C/w telemetry for now.   -Patient to get LifeVest prior to DC.  -Cultures from OR no growth to date.  -Post-procedure CXR without PTX.
-cont dapto  -OR tomorrow for lead/device extraction   -check OR cx      D/w Dr Hauser from ID on 6/15 - pt did not tolerate Ancef due to GI and  issues, had MSSA bacteremia of unclear source
-cont dapto  -OR next week for lead/device extraction   -check OR cx      D/w Dr Hauser from ID on 6/15 - pt did not tolerate Ancef due to GI and  issues, had MSSA bacteremia of unclear source
-cont dapto x 4 more weeks  -weekly cbc, cmp, CPK  -to follow with Dr Stover in [place], [place] while on abx and his labs  -s/p lead/device extraction   -f/u OR cx    Case D/w Dr. Butcher - plan another 4 weeks IV abx and return in 1 month for ICD placement.    Notified Case management RN    Left message today for Dr Stover (ID) to discuss    D/w Dr Stover from ID on 6/15 - pt did not tolerate Ancef due to GI and  issues, had MSSA bacteremia of unclear source
-cont dapto  -OR today for lead/device extraction   -check OR cx      D/w Dr Hauser from ID on 6/15 - pt did not tolerate Ancef due to GI and  issues, had MSSA bacteremia of unclear source
-cont dapto  -OR next week for lead/device extraction 0 check OR cx      D/w Dr Hauser from ID on 6/15 - pt did not tolerate Ancef due to GI and  issues, had MSSA bacteremia of unclear source

## 2018-06-21 NOTE — PROGRESS NOTE ADULT - PROBLEM SELECTOR PROBLEM 3
MSSA bacteremia
Long term (current) use of antibiotics

## 2018-06-21 NOTE — PROGRESS NOTE ADULT - PROBLEM SELECTOR PROBLEM 5
Chronic systolic (congestive) heart failure

## 2018-06-21 NOTE — PROGRESS NOTE ADULT - PROBLEM SELECTOR PROBLEM 2
Congenital heart block
PICC (peripherally inserted central catheter) in place

## 2018-06-21 NOTE — PROVIDER CONTACT NOTE (OTHER) - ASSESSMENT
pt asymptomatic
pt asymptomatic vss
patient AO4 no complaints of pain or discomfort; no complaints of dizziness  site is tender to touch, patient on Coumadin 2mg

## 2018-06-21 NOTE — PROGRESS NOTE ADULT - ATTENDING COMMENTS
Herman Emerson MD  Division of [facility] Medicine  Cell: [phone]  Pager: [phone]  Office: [phone]
Herman Emerson MD  Division of [facility] Medicine  Cell: [phone]  Pager: [phone]  Office: [phone]
Herman Emerson MD  Division of [facility] Medicine  Cell: [phone]  Pager: [phone]  Office: [phone]
Herman Emerson MD  Division of [facility] Medicine  Cell: [phone]  Pager: [phone]  Office: [phone]
Herman Emerson MD  Division of [facility] Medicine  Cell: [phone]  Pager: [phone]  Office: [phone]
Herman Emerson MD  Division of [facility] Medicine  Cell: [phone]  Pager: [phone]  Office: [phone]
Herman Emerson MD  Division of [facility] Medicine  Cell: [phone]  Pager: [phone]  Office: [phone]
discussed with patient and ID, plan for 4 weeks of antibiotic prior to reimplant
Gasper Alegria  Attending Physician   Division of Infectious Disease  Pager #[phone]  After 5pm/weekend or no response, call #[phone]
Karyn Hart MD  [phone] (pager)  [phone] (office)     Please call the ID service [phone] with questions or concerns over the weekend.
Gasper Alegria  Attending Physician   Division of Infectious Disease  Pager #[phone]  After 5pm/weekend or no response, call #[phone]    I am away and will return 6/25. ID available #[phone].
Gasper Alegria  Attending Physician   Division of Infectious Disease  Pager #[phone]  After 5pm/weekend or no response, call #[phone]
Gasper Alegria  Attending Physician   Division of Infectious Disease  Pager #[phone]  After 5pm/weekend or no response, call #[phone]    Please call the ID service [phone] with questions or concerns over the weekend.

## 2018-06-21 NOTE — PROGRESS NOTE ADULT - PROVIDER SPECIALTY LIST ADULT
Electrophysiology
Hospitalist
Infectious Disease
Electrophysiology
Electrophysiology
Infectious Disease

## 2018-06-21 NOTE — PROGRESS NOTE ADULT - PROBLEM SELECTOR PLAN 3
-Patient on Daptomycin, ID consulted to help guidance in terms of duration as he has been on it since April.  -Blood cultures NGTD.   -C/w daptomycin per ID recs. Continue for 4 weeks per ID. Patient to resume with his ID doctor in [place] at his infusion center this Friday.  -Check weekly CK levels, so far acceptable. Last  and acceptable.

## 2018-06-21 NOTE — PROGRESS NOTE ADULT - SUBJECTIVE AND OBJECTIVE BOX
INTERVAL HPI/OVERNIGHT EVENTS: Patient s/p ICD extraction and implantation of leadless PPM 6/19/18. He denies chest pain/sob/dizziness/ palpitations.    MEDICATIONS  (STANDING):  acetaminophen   Tablet. 650 milliGRAM(s) Oral once  carvedilol 12.5 milliGRAM(s) Oral every 12 hours  cholecalciferol 1000 Unit(s) Oral daily  DAPTOmycin IVPB      DAPTOmycin IVPB 500 milliGRAM(s) IV Intermittent every 24 hours  digoxin     Tablet 0.125 milliGRAM(s) Oral daily  eplerenone 25 milliGRAM(s) Oral daily  lactobacillus acidophilus 1 Tablet(s) Oral two times a day with meals  multivitamin 1 Tablet(s) Oral daily  sacubitril 49 mG/valsartan 51 mG 1 Tablet(s) Oral two times a day  tamsulosin 0.4 milliGRAM(s) Oral at bedtime  warfarin 8 milliGRAM(s) Oral at bedtime    MEDICATIONS  (PRN):      Allergies  No Known Allergies    ROS:  General: Pt denies fever/chills/ fatigue  Cardiovascular: denies chest pain/palpitations/leg edema  Respiratory and Thorax: denies SOB  Gastrointestinal: denies abdominal pain/diarrhea/bloody stool  Genitourinary:   Musculoskeletal: denies joint pain or swelling, denies restricted motion  Skin: denies rashes/sores  Hematologic: denies abnormal bleeding    Vital Signs Last 24 Hrs  T(C): 36.4 (19 Jun 2018 23:30), Max: 36.6 (19 Jun 2018 14:45)  T(F): 97.5 (19 Jun 2018 23:30), Max: 97.8 (19 Jun 2018 14:45)  HR: 64 (20 Jun 2018 08:18) (64 - 69)  BP: 84/54 (20 Jun 2018 08:18) (84/54 - 113/58)  BP(mean): 81 (19 Jun 2018 23:00) (79 - 81)  RR: 15 (19 Jun 2018 23:30) (14 - 18)  SpO2: 95% (19 Jun 2018 23:30) (95% - 99%)    Physical Exam:  Constitutional: well developed, well nourished,  and no acute distress  Neurological: Alert & Oriented x 3,  no focal deficits  Respiratory:  breathing nonlabored; CTA bilaterally   Cardiovascular: (+) S1 & S2, RRR  Gastrointestinal: soft, NT, nondistended, (+) BS  Genitourinary: non distended bladder, voiding freely  Extremities: +2 bilateral radial and DP pulses. No pedal edema.   Skin: right groin stitches removed;  no hematoma/ active external bleed. + mild ecchymosis.         Left groin site stitch free dressing; + mild ecchymosis. No hematoma/ active external bleed/ erythema.         Left anterior chest wall surgical incision with dermabond; clean, dry and intact. + ecchymosis. No hematoma/ active external bleed/ erythema.       LABS:                        11.6   7.95  )-----------( 164      ( 20 Jun 2018 08:14 )             35.0     06-20    136  |  99  |  19  ----------------------------<  157<H>  4.7   |  25  |  1.29    Ca    8.7      20 Jun 2018 10:26  Phos  3.3     06-20  Mg     1.7     06-20      PT/INR - ( 20 Jun 2018 10:26 )   PT: 41.9 sec;   INR: 3.74 ratio         PTT - ( 20 Jun 2018 10:26 )  PTT:45.9 sec      RADIOLOGY & ADDITIONAL TESTS:     TELE: Afib Vpaced 70's           one episode of NSVT 6 beats earlier this am.
24H hour events:   No acute events overnight. No syncope, presyncope, palpitations    MEDICATIONS:  carvedilol 12.5 milliGRAM(s) Oral every 12 hours  digoxin     Tablet 0.125 milliGRAM(s) Oral daily  eplerenone 25 milliGRAM(s) Oral daily  heparin  Infusion.  Unit(s)/Hr IV Continuous <Continuous>  heparin  Injectable 5000 Unit(s) IV Push every 6 hours PRN  heparin  Injectable 2500 Unit(s) IV Push every 6 hours PRN  sacubitril 49 mG/valsartan 51 mG 1 Tablet(s) Oral two times a day  tamsulosin 0.4 milliGRAM(s) Oral at bedtime  DAPTOmycin IVPB      DAPTOmycin IVPB 500 milliGRAM(s) IV Intermittent every 24 hours  cholecalciferol 1000 Unit(s) Oral daily  multivitamin 1 Tablet(s) Oral daily        PHYSICAL EXAM:  T(C): 36.8 (06-17-18 @ 04:27), Max: 36.8 (06-16-18 @ 20:27)  HR: 62 (06-17-18 @ 06:04) (56 - 72)  BP: 96/69 (06-17-18 @ 06:04) (88/58 - 100/68)  RR: 18 (06-17-18 @ 04:27) (18 - 18)  SpO2: 95% (06-17-18 @ 04:27) (94% - 97%)  RR: 18 (17 Jun 2018 04:27) (18 - 18)  SpO2: 95% (17 Jun 2018 04:27) (94% - 97%)    I&O's Summary    16 Jun 2018 07:01  -  17 Jun 2018 07:00  --------------------------------------------------------  IN: 1205 mL / OUT: 0 mL / NET: 1205 mL            Appearance: Normal	  HEENT:   Normal oral mucosa  Lymphatic: No lymphadenopathy  Cardiovascular: Normal S1 S2, No JVD, No murmurs, No edema  Respiratory: Lungs clear to auscultation	  Psychiatry: A & O x 3, Mood & affect appropriate  Gastrointestinal:  Soft, Non-tender, + BS		  Neurologic: Non-focal  Extremities: Warm and well perfused. 2+ pulses bilaterally        LABS:	 	    CBC Full  -  ( 16 Jun 2018 17:28 )  WBC Count : 9.4 K/uL  Hemoglobin : 14.1 g/dL  Hematocrit : 40.5 %  Platelet Count - Automated : 206 K/uL  Mean Cell Volume : 95.2 fl  Mean Cell Hemoglobin : 33.1 pg  Mean Cell Hemoglobin Concentration : 34.8 gm/dL  Auto Neutrophil # : x  Auto Lymphocyte # : x  Auto Monocyte # : x  Auto Eosinophil # : x  Auto Basophil # : x  Auto Neutrophil % : x  Auto Lymphocyte % : x  Auto Monocyte % : x  Auto Eosinophil % : x  Auto Basophil % : x        TELEMETRY: 	  Not on telemetry
24H hour events: No acute events overnight     MEDICATIONS:  carvedilol 12.5 milliGRAM(s) Oral every 12 hours  digoxin     Tablet 0.125 milliGRAM(s) Oral daily  eplerenone 25 milliGRAM(s) Oral daily  heparin  Infusion.  Unit(s)/Hr IV Continuous <Continuous>  heparin  Injectable 5000 Unit(s) IV Push every 6 hours PRN  heparin  Injectable 2500 Unit(s) IV Push every 6 hours PRN  sacubitril 49 mG/valsartan 51 mG 1 Tablet(s) Oral two times a day  tamsulosin 0.4 milliGRAM(s) Oral at bedtime    DAPTOmycin IVPB      DAPTOmycin IVPB 500 milliGRAM(s) IV Intermittent every 24 hours    cholecalciferol 1000 Unit(s) Oral daily  multivitamin 1 Tablet(s) Oral daily    REVIEW OF SYSTEMS:  Complete 10point ROS negative.    PHYSICAL EXAM:  T(C): 36.6 (06-18-18 @ 05:13), Max: 36.6 (06-17-18 @ 20:45)  HR: 63 (06-18-18 @ 05:37) (60 - 63)  BP: 94/67 (06-18-18 @ 05:13) (90/63 - 94/67)  RR: 18 (06-18-18 @ 05:13) (18 - 18)  SpO2: 96% (06-18-18 @ 05:13) (96% - 97%)  I&O's Summary    17 Jun 2018 07:01  -  18 Jun 2018 07:00  --------------------------------------------------------  IN: 1432 mL / OUT: 0 mL / NET: 1432 mL    Appearance: Normal	  HEENT:   Normal oral mucosa, PERRL, EOMI	  Lymphatic: No lymphadenopathy  Cardiovascular: Normal S1 S2, No JVD  Respiratory: Lungs clear to auscultation	  Psychiatry: A & O x 3, Mood & affect appropriate  Gastrointestinal:  Soft, Non-tender, + BS	  Skin: No rashes, No ecchymoses, No cyanosis	  Neurologic: Non-focal  Extremities: Normal range of motion, No clubbing, cyanosis or edema  Vascular: Peripheral pulses palpable 2+ bilaterally      LABS:	 	    CBC Full  -  ( 18 Jun 2018 07:00 )  WBC Count : 8.24 K/uL  Hemoglobin : 13.5 g/dL  Hematocrit : 38.4 %  Platelet Count - Automated : 199 K/uL  Mean Cell Volume : 92.1 fl  Mean Cell Hemoglobin : 32.4 pg  Mean Cell Hemoglobin Concentration : 35.2 gm/dL      06-17    138  |  101  |  19  ----------------------------<  90  4.3   |  25  |  1.13    Ca    8.5      17 Jun 2018 08:39      TELEMETRY: Not on tele
INTERVAL HPI/OVERNIGHT EVENTS: Patient denies chest pain/sob/dizziness/ palpitations.     MEDICATIONS  (STANDING):  carvedilol 12.5 milliGRAM(s) Oral every 12 hours  cholecalciferol 1000 Unit(s) Oral daily  DAPTOmycin IVPB      DAPTOmycin IVPB 500 milliGRAM(s) IV Intermittent every 24 hours  digoxin     Tablet 0.125 milliGRAM(s) Oral daily  eplerenone 25 milliGRAM(s) Oral daily  heparin  Infusion.  Unit(s)/Hr (11 mL/Hr) IV Continuous <Continuous>  lactobacillus acidophilus 1 Tablet(s) Oral two times a day with meals  multivitamin 1 Tablet(s) Oral daily  sacubitril 49 mG/valsartan 51 mG 1 Tablet(s) Oral two times a day  tamsulosin 0.4 milliGRAM(s) Oral at bedtime    MEDICATIONS  (PRN):  heparin  Injectable 5000 Unit(s) IV Push every 6 hours PRN For aPTT less than 40  heparin  Injectable 2500 Unit(s) IV Push every 6 hours PRN For aPTT between 40 - 57      Allergies  No Known Allergies    ROS:  General: Pt denies fever/chills  Cardiovascular: denies chest pain/palpitations  Respiratory and Thorax: denies SOB  Gastrointestinal: denies abdominal pain/diarrhea/bloody stool  Genitourinary: denies dysuria/hematuria  Musculoskeletal: denies joint pain or swelling, denies restricted motion  Skin: denies rashes/sores  Hematologic: denies abnormal bleeding    Vital Signs Last 24 Hrs  T(C): 36.5 (19 Jun 2018 04:23), Max: 36.8 (18 Jun 2018 20:56)  T(F): 97.7 (19 Jun 2018 04:23), Max: 98.2 (18 Jun 2018 20:56)  HR: 60 (19 Jun 2018 04:23) (59 - 60)  BP: 90/52 (19 Jun 2018 08:23) (87/57 - 93/61)  BP(mean): --  RR: 18 (19 Jun 2018 04:23) (18 - 18)  SpO2: 99% (19 Jun 2018 04:23) (97% - 99%)    Physical Exam:  Constitutional: well developed, well nourished and in  no acute distress  Neurological: Alert & Oriented x 3,  no focal deficits, LOOMIS  Respiratory: breathing nonlabored; CTA bilaterally  Cardiovascular: (+) S1 & S2  Gastrointestinal: soft, NT, nondistended, (+) BS  Extremities: 2+ bilateral radial and DP pulses. No pedal edema.  Skin:  normal skin color and pigmentation, no skin lesions. Right upper arm PICC with clean, dry and intact dressing.       LABS:                        12.8   7.41  )-----------( 190      ( 19 Jun 2018 08:18 )             38.8     06-19    135  |  100  |  18  ----------------------------<  88  4.2   |  23  |  1.14    Ca    9.0      19 Jun 2018 06:40      PT/INR - ( 19 Jun 2018 06:40 )   PT: 25.4 sec;   INR: 2.29 ratio         PTT - ( 18 Jun 2018 07:00 )  PTT:68.9 sec      RADIOLOGY & ADDITIONAL TESTS:< from: Xray Chest 1 View- PORTABLE-Urgent (06.14.18 @ 18:01) >  Impression:  PICC line tip is in the SVC.  Clear lungs.
INTERVAL HPI/OVERNIGHT EVENTS: Patient denies chest pain/sob/dizziness/ palpitations.     MEDICATIONS  (STANDING):  carvedilol 12.5 milliGRAM(s) Oral every 12 hours  cholecalciferol 1000 Unit(s) Oral daily  DAPTOmycin IVPB      DAPTOmycin IVPB 500 milliGRAM(s) IV Intermittent every 24 hours  digoxin     Tablet 0.125 milliGRAM(s) Oral daily  eplerenone 25 milliGRAM(s) Oral daily  lactobacillus acidophilus 1 Tablet(s) Oral two times a day with meals  multivitamin 1 Tablet(s) Oral daily  sacubitril 49 mG/valsartan 51 mG 1 Tablet(s) Oral two times a day  tamsulosin 0.4 milliGRAM(s) Oral at bedtime    MEDICATIONS  (PRN):      Allergies  No Known Allergies    ROS:  General: Pt denies fever/chills/ constitutional discomfort.  Cardiovascular: denies chest pain/palpitations  Respiratory and Thorax: denies SOB  Gastrointestinal: denies abdominal pain/diarrhea/bloody stool  Genitourinary: denies dysuria/ hematuria  Hematologic: denies abnormal bleeding    Vital Signs Last 24 Hrs  T(C): 36.6 (16 Jun 2018 04:26), Max: 36.6 (16 Jun 2018 04:26)  T(F): 97.9 (16 Jun 2018 04:26), Max: 97.9 (16 Jun 2018 04:26)  HR: 72 (16 Jun 2018 08:45) (58 - 72)  BP: 100/68 (16 Jun 2018 08:45) (90/59 - 100/68)  BP(mean): --  RR: 18 (16 Jun 2018 08:45) (18 - 18)  SpO2: 92% (16 Jun 2018 04:26) (92% - 100%)    Physical Exam:  Constitutional: well developed, well nourished  and in no acute distress  Neurological: Alert & Oriented x 3,  no focal deficits, LOOMIS  Respiratory: Breathing nonlabored; CTA bilaterally  Cardiovascular: (+) S1 & S2  Gastrointestinal: soft, NT, nondistended, (+) BS  Extremities: +2 bilateral radial and DP pulses. No pedal edema.   Skin:  normal skin color and pigmentation, no skin lesions. Left anterior chest wall ICD site intact.   + Right upper arm PICC site  with dressing clean, dry and intact.     LABS:                        13.8   7.17  )-----------( 210      ( 15 Kenan 2018 07:48 )             41.2     06-15    140  |  102  |  22  ----------------------------<  85  4.3   |  27  |  1.25    Ca    9.4      15 Kenan 2018 06:00  Mg     1.8     06-14    TPro  6.7  /  Alb  3.7  /  TBili  0.4  /  DBili  x   /  AST  19  /  ALT  19  /  AlkPhos  77  06-14    PT/INR - ( 15 Kenan 2018 08:00 )   PT: 42.7 sec;   INR: 3.68 ratio         PTT - ( 15 Kenan 2018 08:00 )  PTT:60.0 sec      TELE: Off tele since 6/15/18
INTERVAL HPI/OVERNIGHT EVENTS: left groin site bleed overnight; since resolved, site now stable. Patient denies chest pain/sob/ dizziness/ palpitations/ bilateral groin discomfort.     MEDICATIONS  (STANDING):  acetaminophen   Tablet. 650 milliGRAM(s) Oral once  carvedilol 12.5 milliGRAM(s) Oral every 12 hours  cholecalciferol 1000 Unit(s) Oral daily  DAPTOmycin IVPB      DAPTOmycin IVPB 500 milliGRAM(s) IV Intermittent every 24 hours  digoxin     Tablet 0.125 milliGRAM(s) Oral daily  eplerenone 25 milliGRAM(s) Oral daily  lactobacillus acidophilus 1 Tablet(s) Oral two times a day with meals  multivitamin 1 Tablet(s) Oral daily  sacubitril 49 mG/valsartan 51 mG 1 Tablet(s) Oral two times a day  tamsulosin 0.4 milliGRAM(s) Oral at bedtime    MEDICATIONS  (PRN):  acetaminophen   Tablet. 650 milliGRAM(s) Oral every 6 hours PRN Moderate Pain (4 - 6)    Allergies  No Known Allergies    ROS:  General: Pt denies fever/chills  Cardiovascular: denies chest pain/palpitations/leg edema  Respiratory and Thorax: denies SOB  Gastrointestinal: denies abdominal pain/diarrhea/bloody stool  Genitourinary: denies dysuria/ hematuria.   Musculoskeletal: denies joint pain or swelling, denies restricted motion  Skin: denies rashes/sores  Hematologic: + left groin site bleed overnight, now resolved.     Vital Signs Last 24 Hrs  T(C): 36.5 (21 Jun 2018 04:08), Max: 36.9 (20 Jun 2018 20:57)  T(F): 97.7 (21 Jun 2018 04:08), Max: 98.5 (20 Jun 2018 20:57)  HR: 74 (21 Jun 2018 04:08) (70 - 74)  BP: 90/59 (21 Jun 2018 04:08) (82/60 - 96/61)  BP(mean): --  RR: 18 (21 Jun 2018 04:08) (18 - 18)  SpO2: 98% (21 Jun 2018 04:08) (97% - 98%)    Physical Exam:  Constitutional: well developed, well nourished,  and no acute distress  Neurological: Alert & Oriented x 3,  no focal deficits  Respiratory:  Breathing nonlabored; CTA bilaterally  Cardiovascular: (+) S1 & S2, RRR  Gastrointestinal: soft, NT, nondistended, (+) BS  Genitourinary: non distended bladder, voiding freely  Extremities: No pedal edema, No clubbing, No cyanosis  Skin:  bilateral groin site with clean, dry and intact dressings; no hematoma/ active external bleed. + ecchymosis           Left anterior chest wall site with dermabond; clean, dry and intact. No hematoma/ active external bleed. + ecchymosis.     LABS:                        10.3   10.77 )-----------( 144      ( 21 Jun 2018 08:03 )             31.2     06-21    137  |  101  |  18  ----------------------------<  91  4.3   |  26  |  1.13    Ca    8.2<L>      21 Jun 2018 07:26  Phos  3.3     06-20  Mg     1.8     06-21      PT/INR - ( 21 Jun 2018 08:13 )   PT: 57.2 sec;   INR: 4.90 ratio       PTT - ( 20 Jun 2018 10:26 )  PTT:45.9 sec    TELE: Afib Vpaced w/ occasional PVCs 70's
RYAN GONSALVES 58y [id number]    Patient is a 58y old  Male who presents with a chief complaint of lead extraction (14 Jun 2018 15:54)      Follow Up/CC:  ID following for bacteremia    Interval History/ROS: feels well, OR cancelled and rescheduled for 6/19    Allergies    No Known Allergies    Intolerances        ANTIMICROBIALS:  DAPTOmycin IVPB    DAPTOmycin IVPB 500 every 24 hours      MEDICATIONS  (STANDING):  carvedilol 12.5 milliGRAM(s) Oral every 12 hours  cholecalciferol 1000 Unit(s) Oral daily  DAPTOmycin IVPB      DAPTOmycin IVPB 500 milliGRAM(s) IV Intermittent every 24 hours  digoxin     Tablet 0.125 milliGRAM(s) Oral daily  eplerenone 25 milliGRAM(s) Oral daily  lactobacillus acidophilus 1 Tablet(s) Oral two times a day with meals  multivitamin 1 Tablet(s) Oral daily  sacubitril 49 mG/valsartan 51 mG 1 Tablet(s) Oral two times a day  tamsulosin 0.4 milliGRAM(s) Oral at bedtime    MEDICATIONS  (PRN):        Vital Signs Last 24 Hrs  T(C): 36.3 (15 Kenan 2018 12:01), Max: 36.6 (14 Jun 2018 20:47)  T(F): 97.4 (15 Kenan 2018 12:01), Max: 97.9 (14 Jun 2018 20:47)  HR: 62 (15 Kenan 2018 12:01) (60 - 64)  BP: 96/64 (15 Kenan 2018 12:01) (92/62 - 100/66)  BP(mean): --  RR: 18 (15 Kenan 2018 12:01) (18 - 18)  SpO2: 100% (15 Kenan 2018 12:01) (95% - 100%)    CBC Full  -  ( 15 Kenan 2018 07:48 )  WBC Count : 7.17 K/uL  Hemoglobin : 13.8 g/dL  Hematocrit : 41.2 %  Platelet Count - Automated : 210 K/uL  Mean Cell Volume : 92.4 fl  Mean Cell Hemoglobin : 30.9 pg  Mean Cell Hemoglobin Concentration : 33.5 gm/dL  Auto Neutrophil # : x  Auto Lymphocyte # : x  Auto Monocyte # : x  Auto Eosinophil # : x  Auto Basophil # : x  Auto Neutrophil % : x  Auto Lymphocyte % : x  Auto Monocyte % : x  Auto Eosinophil % : x  Auto Basophil % : x    06-15    140  |  102  |  22  ----------------------------<  85  4.3   |  27  |  1.25    Ca    9.4      15 Kenan 2018 06:00  Mg     1.8     06-14    TPro  6.7  /  Alb  3.7  /  TBili  0.4  /  DBili  x   /  AST  19  /  ALT  19  /  AlkPhos  77  06-14    LIVER FUNCTIONS - ( 14 Jun 2018 18:23 )  Alb: 3.7 g/dL / Pro: 6.7 g/dL / ALK PHOS: 77 U/L / ALT: 19 U/L / AST: 19 U/L / GGT: x               MICROBIOLOGY:    RADIOLOGY    Xray Chest 1 View- PORTABLE-Urgent (06.14.18 @ 18:01) >  PICC line tip is in the SVC.    Clear lungs
RYAN GONSALVES 58y [id number]    Patient is a 58y old  Male who presents with a chief complaint of lead extraction (14 Jun 2018 15:54)      Follow Up/CC:  ID following for bacteremia    Interval History/ROS: feels well, OR cancelled and rescheduled for 6/19  No complaints today. ROS negative.    Allergies    No Known Allergies      ANTIMICROBIALS:    DAPTOmycin IVPB 500 every 24 hours      MEDICATIONS  (STANDING):  carvedilol 12.5 every 12 hours  digoxin     Tablet 0.125 daily  eplerenone 25 daily  heparin  Infusion.  <Continuous>  heparin  Injectable 5000 every 6 hours PRN  heparin  Injectable 2500 every 6 hours PRN  sacubitril 49 mG/valsartan 51 mG 1 two times a day  tamsulosin 0.4 at bedtime      Vital Signs Last 24 Hrs  T(F): 98.2 (06-17-18 @ 04:27), Max: 98.3 (06-16-18 @ 20:27)  HR: 62 (06-17-18 @ 06:04)  BP: 96/69 (06-17-18 @ 06:04)  RR: 18 (06-17-18 @ 04:27)  SpO2: 95% (06-17-18 @ 04:27) (94% - 97%)  Wt(kg): --    PE:  Gen: NAD, AOx3, Non-toxic  HEENT: EOMI  CV: S1S2  PPM site seems full, but no erythema  Resp: CTA b/l  Abd: +BS, soft  Ext: no c/c/e  IV LUE - site okay, PICC RUE, site not visualized, deferred by pt.                                14.1   9.4   )-----------( 206      ( 16 Jun 2018 17:28 )             40.5 06-17    138  |  101  |  19  ----------------------------<  90  4.3   |  25  |  1.13  Ca    8.5      17 Jun 2018 08:39    MICROBIOLOGY:  Culture - Blood (06.14.18 @ 20:48)    Specimen Source: .Blood Blood    Culture Results:   No growth to date.        Culture - Blood (06.14.18 @ 20:48)    Specimen Source: .Blood Blood    Culture Results:   No growth to date.    RADIOLOGY    Xray Chest 1 View- PORTABLE-Urgent (06.14.18 @ 18:01) >  PICC line tip is in the SVC.    Clear lungs
24H hour events: No acute events overnight     MEDICATIONS:  carvedilol 12.5 milliGRAM(s) Oral every 12 hours  digoxin     Tablet 0.125 milliGRAM(s) Oral daily  eplerenone 25 milliGRAM(s) Oral daily  sacubitril 49 mG/valsartan 51 mG 1 Tablet(s) Oral two times a day  sacubitril 49 mG/valsartan 51 mG 1 Tablet(s) Oral two times a day  tamsulosin 0.4 milliGRAM(s) Oral at bedtime    DAPTOmycin IVPB      DAPTOmycin IVPB 500 milliGRAM(s) IV Intermittent every 24 hours    cholecalciferol 1000 Unit(s) Oral daily  multivitamin 1 Tablet(s) Oral daily    REVIEW OF SYSTEMS:  Complete 10point ROS negative.    PHYSICAL EXAM:  T(C): 36.5 (06-15-18 @ 04:00), Max: 36.6 (06-14-18 @ 20:47)  HR: 62 (06-15-18 @ 04:00) (60 - 64)  BP: 95/63 (06-15-18 @ 04:00) (92/62 - 105/67)  RR: 18 (06-15-18 @ 04:00) (16 - 18)  SpO2: 97% (06-15-18 @ 04:00) (95% - 98%)  I&O's Summary    14 Jun 2018 07:01  -  15 Kenan 2018 07:00  --------------------------------------------------------  IN: 240 mL / OUT: 0 mL / NET: 240 mL      Appearance: NAD	  HEENT:   Normal oral mucosa, PERRL, EOMI	  Lymphatic: No lymphadenopathy  Cardiovascular: Normal S1 S2, No JVD, No murmurs, No edema  Respiratory: Lungs clear to auscultation	  Psychiatry: A & O x 3, Mood & affect appropriate  Gastrointestinal:  Soft, Non-tender, + BS	  Skin: No rashes, No ecchymoses, No cyanosis	  Neurologic: Non-focal  Extremities: Normal range of motion, No clubbing, or cyanosis. Right upper arm PICC line in place   Vascular: Peripheral pulses palpable 2+ bilaterally      LABS:	 	    CBC Full  -  ( 15 Kenan 2018 07:48 )  WBC Count : 7.17 K/uL  Hemoglobin : 13.8 g/dL  Hematocrit : 41.2 %  Platelet Count - Automated : 210 K/uL  Mean Cell Volume : 92.4 fl  Mean Cell Hemoglobin : 30.9 pg  Mean Cell Hemoglobin Concentration : 33.5 gm/dL      06-15    140  |  102  |  22  ----------------------------<  85  4.3   |  27  |  1.25  06-14    138  |  102  |  26<H>  ----------------------------<  86  4.3   |  23  |  1.20    Ca    9.4      15 Kenan 2018 06:00  Ca    9.2      14 Jun 2018 18:23  Mg     1.8     06-14    TPro  6.7  /  Alb  3.7  /  TBili  0.4  /  DBili  x   /  AST  19  /  ALT  19  /  AlkPhos  77  06-14    Creatine Kinase, Serum: 52 U/L (06-15-18 @ 06:00)      TELEMETRY: Afib/ V paced at 60-70's, PVCs
Patient is a 58y old  Male who presents with a chief complaint of lead extraction (14 Jun 2018 15:54)        SUBJECTIVE / OVERNIGHT EVENTS: Patient denies CP, SOB, N/V.       MEDICATIONS  (STANDING):  carvedilol 12.5 milliGRAM(s) Oral every 12 hours  cholecalciferol 1000 Unit(s) Oral daily  DAPTOmycin IVPB      DAPTOmycin IVPB 500 milliGRAM(s) IV Intermittent every 24 hours  digoxin     Tablet 0.125 milliGRAM(s) Oral daily  eplerenone 25 milliGRAM(s) Oral daily  lactobacillus acidophilus 1 Tablet(s) Oral two times a day with meals  multivitamin 1 Tablet(s) Oral daily  sacubitril 49 mG/valsartan 51 mG 1 Tablet(s) Oral two times a day  tamsulosin 0.4 milliGRAM(s) Oral at bedtime    MEDICATIONS  (PRN):      Vital Signs Last 24 Hrs  T(C): 36.3 (15 Kenan 2018 12:01), Max: 36.6 (14 Jun 2018 20:47)  T(F): 97.4 (15 Kenan 2018 12:01), Max: 97.9 (14 Jun 2018 20:47)  HR: 62 (15 Kenan 2018 12:01) (60 - 64)  BP: 96/64 (15 Kenan 2018 12:01) (92/62 - 100/66)  BP(mean): --  RR: 18 (15 Kenan 2018 12:01) (18 - 18)  SpO2: 100% (15 Kenan 2018 12:01) (95% - 100%)  CAPILLARY BLOOD GLUCOSE        I&O's Summary    14 Jun 2018 07:01  -  15 Kenan 2018 07:00  --------------------------------------------------------  IN: 240 mL / OUT: 0 mL / NET: 240 mL    15 Kenan 2018 07:01  -  15 Kenan 2018 15:53  --------------------------------------------------------  IN: 480 mL / OUT: 0 mL / NET: 480 mL          PHYSICAL EXAM:  GENERAL: NAD, well-developed  HEAD: Atraumatic, Normocephalic  EYES: EOMI, PERRLA, conjunctiva and sclera clear  NECK: Supple  CHEST/LUNG: Clear to auscultation bilaterally; No wheeze  HEART: Irregular rate and rhythm; No murmurs, rubs, or gallops  ABDOMEN: Soft, Nontender, Nondistended; Bowel sounds present  EXTREMITIES: No clubbing, cyanosis, or edema  PSYCH/Neuro: AAOx3. Non-focal.  SKIN: No rashes or lesions      LABS:                        13.8   7.17  )-----------( 210      ( 15 Kenan 2018 07:48 )             41.2     06-15    140  |  102  |  22  ----------------------------<  85  4.3   |  27  |  1.25    Ca    9.4      15 Kenan 2018 06:00  Mg     1.8     06-14    TPro  6.7  /  Alb  3.7  /  TBili  0.4  /  DBili  x   /  AST  19  /  ALT  19  /  AlkPhos  77  06-14    PT/INR - ( 15 Kenan 2018 08:00 )   PT: 42.7 sec;   INR: 3.68 ratio         PTT - ( 15 Kenan 2018 08:00 )  PTT:60.0 sec  CARDIAC MARKERS ( 15 Kenan 2018 06:00 )  x     / x     / 52 U/L / x     / x          Telemetry reviewed: Afib 60-70's. PVC. Vpaced.       RADIOLOGY & ADDITIONAL TESTS:    Imaging Personally Reviewed:  Consultant(s) Notes Reviewed: ID, EPS  Care Discussed with Consultants/Other Providers:
Patient is a 58y old  Male who presents with a chief complaint of lead extraction (14 Jun 2018 15:54)        SUBJECTIVE / OVERNIGHT EVENTS: Patient denies any SOB or CP or N/V.       MEDICATIONS  (STANDING):  carvedilol 12.5 milliGRAM(s) Oral every 12 hours  cholecalciferol 1000 Unit(s) Oral daily  DAPTOmycin IVPB      DAPTOmycin IVPB 500 milliGRAM(s) IV Intermittent every 24 hours  digoxin     Tablet 0.125 milliGRAM(s) Oral daily  eplerenone 25 milliGRAM(s) Oral daily  heparin  Infusion.  Unit(s)/Hr (11 mL/Hr) IV Continuous <Continuous>  lactobacillus acidophilus 1 Tablet(s) Oral two times a day with meals  multivitamin 1 Tablet(s) Oral daily  sacubitril 49 mG/valsartan 51 mG 1 Tablet(s) Oral two times a day  tamsulosin 0.4 milliGRAM(s) Oral at bedtime  warfarin 10 milliGRAM(s) Oral once    MEDICATIONS  (PRN):  heparin  Injectable 5000 Unit(s) IV Push every 6 hours PRN For aPTT less than 40  heparin  Injectable 2500 Unit(s) IV Push every 6 hours PRN For aPTT between 40 - 57      Vital Signs Last 24 Hrs  T(C): 36.3 (18 Jun 2018 14:02), Max: 36.6 (17 Jun 2018 20:45)  T(F): 97.4 (18 Jun 2018 14:02), Max: 97.9 (18 Jun 2018 05:13)  HR: 59 (18 Jun 2018 14:02) (59 - 63)  BP: 92/49 (18 Jun 2018 14:02) (92/49 - 94/67)  BP(mean): --  RR: 18 (18 Jun 2018 14:02) (18 - 18)  SpO2: 97% (18 Jun 2018 14:02) (96% - 97%)  CAPILLARY BLOOD GLUCOSE        I&O's Summary    17 Jun 2018 07:01  -  18 Jun 2018 07:00  --------------------------------------------------------  IN: 1432 mL / OUT: 0 mL / NET: 1432 mL    18 Jun 2018 07:01  -  18 Jun 2018 16:29  --------------------------------------------------------  IN: 600 mL / OUT: 0 mL / NET: 600 mL          PHYSICAL EXAM:  GENERAL: NAD, well-developed  HEAD:  Atraumatic, Normocephalic  EYES: EOMI, PERRLA, conjunctiva and sclera clear  NECK: Supple  CHEST/LUNG: Clear to auscultation bilaterally; No wheeze  HEART: Irregular rate and rhythm; No murmurs, rubs, or gallops  ABDOMEN: Soft, Nontender, Nondistended; Bowel sounds present  EXTREMITIES: No clubbing, cyanosis, or edema  PSYCH/Neuro: AAOx3. Non-focal.   SKIN: No rashes or lesions      LABS:                        13.5   8.24  )-----------( 199      ( 18 Jun 2018 07:00 )             38.4     06-17    138  |  101  |  19  ----------------------------<  90  4.3   |  25  |  1.13    Ca    8.5      17 Jun 2018 08:39      PT/INR - ( 18 Jun 2018 07:00 )   PT: 20.0 sec;   INR: 1.75 ratio         PTT - ( 18 Jun 2018 07:00 )  PTT:68.9 sec        RADIOLOGY & ADDITIONAL TESTS:    Imaging Personally Reviewed:   Consultant(s) Notes Reviewed: EPS, ID  Care Discussed with Consultants/Other Providers: EP NP
Patient is a 58y old  Male who presents with a chief complaint of lead extraction (14 Jun 2018 15:54)        SUBJECTIVE / OVERNIGHT EVENTS: Patient denies any pain or SOB or N/V.       MEDICATIONS  (STANDING):  carvedilol 12.5 milliGRAM(s) Oral every 12 hours  cholecalciferol 1000 Unit(s) Oral daily  DAPTOmycin IVPB      DAPTOmycin IVPB 500 milliGRAM(s) IV Intermittent every 24 hours  digoxin     Tablet 0.125 milliGRAM(s) Oral daily  eplerenone 25 milliGRAM(s) Oral daily  heparin  Infusion.  Unit(s)/Hr (11 mL/Hr) IV Continuous <Continuous>  lactobacillus acidophilus 1 Tablet(s) Oral two times a day with meals  multivitamin 1 Tablet(s) Oral daily  sacubitril 49 mG/valsartan 51 mG 1 Tablet(s) Oral two times a day  tamsulosin 0.4 milliGRAM(s) Oral at bedtime  warfarin 3 milliGRAM(s) Oral once    MEDICATIONS  (PRN):  heparin  Injectable 5000 Unit(s) IV Push every 6 hours PRN For aPTT less than 40  heparin  Injectable 2500 Unit(s) IV Push every 6 hours PRN For aPTT between 40 - 57      Vital Signs Last 24 Hrs  T(C): 36.4 (16 Jun 2018 11:53), Max: 36.6 (16 Jun 2018 04:26)  T(F): 97.5 (16 Jun 2018 11:53), Max: 97.9 (16 Jun 2018 04:26)  HR: 56 (16 Jun 2018 11:53) (56 - 72)  BP: 88/58 (16 Jun 2018 11:53) (88/58 - 100/68)  BP(mean): --  RR: 18 (16 Jun 2018 11:53) (18 - 18)  SpO2: 97% (16 Jun 2018 11:53) (92% - 97%)  CAPILLARY BLOOD GLUCOSE        I&O's Summary    15 Kenan 2018 07:01  -  16 Jun 2018 07:00  --------------------------------------------------------  IN: 530 mL / OUT: 0 mL / NET: 530 mL    16 Jun 2018 07:01 - 16 Jun 2018 15:00  --------------------------------------------------------  IN: 480 mL / OUT: 0 mL / NET: 480 mL          PHYSICAL EXAM:  GENERAL: NAD, well-developed  HEAD: Atraumatic, Normocephalic  EYES: EOMI, PERRLA, conjunctiva and sclera clear  NECK: Supple  CHEST/LUNG: Clear to auscultation bilaterally; No wheeze  HEART: Irregular rate and rhythm; No murmurs, rubs, or gallops  ABDOMEN: Soft, Nontender, Nondistended; Bowel sounds present  EXTREMITIES: No clubbing, cyanosis, or edema  PSYCH/Neuro: AAOx3. Non-focal.   SKIN: No rashes or lesions      LABS:                        13.4   7.76  )-----------( 207      ( 16 Jun 2018 09:07 )             40.8     06-15    140  |  102  |  22  ----------------------------<  85  4.3   |  27  |  1.25    Ca    9.4      15 Kenan 2018 06:00  Mg     1.8     06-14    TPro  6.7  /  Alb  3.7  /  TBili  0.4  /  DBili  x   /  AST  19  /  ALT  19  /  AlkPhos  77  06-14    PT/INR - ( 16 Jun 2018 09:28 )   PT: 24.8 sec;   INR: 2.16 ratio         PTT - ( 15 Kenan 2018 08:00 )  PTT:60.0 sec  CARDIAC MARKERS ( 15 Kenan 2018 06:00 )  x     / x     / 52 U/L / x     / x              RADIOLOGY & ADDITIONAL TESTS:    Imaging Personally Reviewed:   Consultant(s) Notes Reviewed: EPS.   Care Discussed with Consultants/Other Providers: EPS NP
Patient is a 58y old  Male who presents with a chief complaint of lead extraction (14 Jun 2018 15:54)        SUBJECTIVE / OVERNIGHT EVENTS: Patient denies any pain or SOB or N/V. Anxious to get the procedure.       MEDICATIONS  (STANDING):  carvedilol 12.5 milliGRAM(s) Oral every 12 hours  cholecalciferol 1000 Unit(s) Oral daily  DAPTOmycin IVPB      DAPTOmycin IVPB 500 milliGRAM(s) IV Intermittent every 24 hours  digoxin     Tablet 0.125 milliGRAM(s) Oral daily  eplerenone 25 milliGRAM(s) Oral daily  heparin  Infusion.  Unit(s)/Hr (11 mL/Hr) IV Continuous <Continuous>  lactobacillus acidophilus 1 Tablet(s) Oral two times a day with meals  multivitamin 1 Tablet(s) Oral daily  sacubitril 49 mG/valsartan 51 mG 1 Tablet(s) Oral two times a day  tamsulosin 0.4 milliGRAM(s) Oral at bedtime    MEDICATIONS  (PRN):  heparin  Injectable 5000 Unit(s) IV Push every 6 hours PRN For aPTT less than 40  heparin  Injectable 2500 Unit(s) IV Push every 6 hours PRN For aPTT between 40 - 57      Vital Signs Last 24 Hrs  T(C): 36.6 (19 Jun 2018 14:45), Max: 36.8 (18 Jun 2018 20:56)  T(F): 97.8 (19 Jun 2018 14:45), Max: 98.2 (18 Jun 2018 20:56)  HR: 68 (19 Jun 2018 14:45) (60 - 77)  BP: 98/60 (19 Jun 2018 14:45) (87/57 - 102/70)  BP(mean): --  RR: 18 (19 Jun 2018 14:45) (18 - 18)  SpO2: 96% (19 Jun 2018 14:45) (96% - 99%)  CAPILLARY BLOOD GLUCOSE        I&O's Summary    18 Jun 2018 07:01  -  19 Jun 2018 07:00  --------------------------------------------------------  IN: 600 mL / OUT: 0 mL / NET: 600 mL          PHYSICAL EXAM:  GENERAL: NAD, well-developed  HEAD: Atraumatic, Normocephalic  EYES: EOMI, PERRLA, conjunctiva and sclera clear  NECK: Supple  CHEST/LUNG: Clear to auscultation bilaterally; No wheeze  HEART: Irregular rate and rhythm; No murmurs, rubs, or gallops  ABDOMEN: Soft, Nontender, Nondistended; Bowel sounds present  EXTREMITIES: No clubbing, cyanosis, or edema  PSYCH/Neuro: AAOx3. Non-focal.   SKIN: No rashes or lesions      LABS:                        12.8   7.41  )-----------( 190      ( 19 Jun 2018 08:18 )             38.8     06-19    135  |  100  |  18  ----------------------------<  88  4.2   |  23  |  1.14    Ca    9.0      19 Jun 2018 06:40      PT/INR - ( 19 Jun 2018 06:40 )   PT: 25.4 sec;   INR: 2.29 ratio         PTT - ( 19 Jun 2018 08:18 )  PTT:45.9 sec          RADIOLOGY & ADDITIONAL TESTS:    Imaging Personally Reviewed:   Consultant(s) Notes Reviewed: EPS, ID   Care Discussed with Consultants/Other Providers:
Patient is a 58y old  Male who presents with a chief complaint of lead extraction (20 Jun 2018 15:46)        SUBJECTIVE / OVERNIGHT EVENTS: Patient denies any pain or SOB or N/V or HA. He had some blood from his groin site but it stopped overnight.         MEDICATIONS  (STANDING):  acetaminophen   Tablet. 650 milliGRAM(s) Oral once  carvedilol 12.5 milliGRAM(s) Oral every 12 hours  cholecalciferol 1000 Unit(s) Oral daily  DAPTOmycin IVPB      DAPTOmycin IVPB 500 milliGRAM(s) IV Intermittent every 24 hours  digoxin     Tablet 0.125 milliGRAM(s) Oral daily  eplerenone 25 milliGRAM(s) Oral daily  lactobacillus acidophilus 1 Tablet(s) Oral two times a day with meals  multivitamin 1 Tablet(s) Oral daily  sacubitril 49 mG/valsartan 51 mG 1 Tablet(s) Oral two times a day  tamsulosin 0.4 milliGRAM(s) Oral at bedtime    MEDICATIONS  (PRN):  acetaminophen   Tablet. 650 milliGRAM(s) Oral every 6 hours PRN Moderate Pain (4 - 6)      Vital Signs Last 24 Hrs  T(C): 36.5 (21 Jun 2018 04:08), Max: 36.9 (20 Jun 2018 20:57)  T(F): 97.7 (21 Jun 2018 04:08), Max: 98.5 (20 Jun 2018 20:57)  HR: 74 (21 Jun 2018 04:08) (70 - 74)  BP: 90/59 (21 Jun 2018 04:08) (82/60 - 96/61)  BP(mean): --  RR: 18 (21 Jun 2018 04:08) (18 - 18)  SpO2: 98% (21 Jun 2018 04:08) (97% - 98%)  CAPILLARY BLOOD GLUCOSE        I&O's Summary    20 Jun 2018 07:01  -  21 Jun 2018 07:00  --------------------------------------------------------  IN: 1320 mL / OUT: 550 mL / NET: 770 mL    21 Jun 2018 07:01  -  21 Jun 2018 15:24  --------------------------------------------------------  IN: 600 mL / OUT: 0 mL / NET: 600 mL        PHYSICAL EXAM:  GENERAL: NAD, well-developed  HEAD: Atraumatic, Normocephalic  EYES: EOMI, PERRLA, conjunctiva and sclera clear  NECK: Supple  CHEST/LUNG: Clear to auscultation bilaterally; No wheeze; left upper chest site PPM extraction site clean.   HEART: Irregular rate and rhythm; No murmurs, rubs, or gallops  ABDOMEN: Soft, Nontender, Nondistended; Bowel sounds present  EXTREMITIES: No clubbing, cyanosis, or edema  PSYCH/Neuro: AAOx3. Non-focal.   SKIN: Bilateral groin procedure sites with some mild bruising and dried blood. Mild tenderness. No signs of hematoma/swelling.       LABS:                        10.3   10.77 )-----------( 144      ( 21 Jun 2018 08:03 )             31.2     06-21    137  |  101  |  18  ----------------------------<  91  4.3   |  26  |  1.13    Ca    8.2<L>      21 Jun 2018 07:26  Phos  3.3     06-20  Mg     1.8     06-21      PT/INR - ( 21 Jun 2018 08:13 )   PT: 57.2 sec;   INR: 4.90 ratio         PTT - ( 20 Jun 2018 10:26 )  PTT:45.9 sec  CARDIAC MARKERS ( 20 Jun 2018 06:58 )  x     / x     / 182 U/L / x     / x          Telemetry reviewed: Afib 70's. Vpaced.     RADIOLOGY & ADDITIONAL TESTS:    Imaging Personally Reviewed:   Consultant(s) Notes Reviewed: EPS  Care Discussed with Consultants/Other Providers:
Patient is a 58y old  Male who presents with a chief complaint of lead extraction (20 Jun 2018 15:46)        SUBJECTIVE / OVERNIGHT EVENTS: Patient says he feels well overall. Denies any SOB or N/V. Has a little pain at the PPM extraction site.       MEDICATIONS  (STANDING):  acetaminophen   Tablet. 650 milliGRAM(s) Oral once  carvedilol 12.5 milliGRAM(s) Oral every 12 hours  cholecalciferol 1000 Unit(s) Oral daily  DAPTOmycin IVPB      DAPTOmycin IVPB 500 milliGRAM(s) IV Intermittent every 24 hours  digoxin     Tablet 0.125 milliGRAM(s) Oral daily  eplerenone 25 milliGRAM(s) Oral daily  lactobacillus acidophilus 1 Tablet(s) Oral two times a day with meals  multivitamin 1 Tablet(s) Oral daily  sacubitril 49 mG/valsartan 51 mG 1 Tablet(s) Oral two times a day  tamsulosin 0.4 milliGRAM(s) Oral at bedtime  warfarin 2 milliGRAM(s) Oral once    MEDICATIONS  (PRN):      Vital Signs Last 24 Hrs  T(C): 37 (20 Jun 2018 11:18), Max: 37 (20 Jun 2018 11:18)  T(F): 98.6 (20 Jun 2018 11:18), Max: 98.6 (20 Jun 2018 11:18)  HR: 70 (20 Jun 2018 11:18) (64 - 70)  BP: 82/50 (20 Jun 2018 11:18) (82/50 - 113/58)  BP(mean): 81 (19 Jun 2018 23:00) (79 - 81)  RR: 18 (20 Jun 2018 11:18) (14 - 18)  SpO2: 96% (20 Jun 2018 11:18) (95% - 99%)  CAPILLARY BLOOD GLUCOSE        I&O's Summary    19 Jun 2018 07:01  -  20 Jun 2018 07:00  --------------------------------------------------------  IN: 170 mL / OUT: 0 mL / NET: 170 mL    20 Jun 2018 07:01  -  20 Jun 2018 16:35  --------------------------------------------------------  IN: 720 mL / OUT: 0 mL / NET: 720 mL          PHYSICAL EXAM:  GENERAL: NAD, well-developed  HEAD: Atraumatic, Normocephalic  EYES: EOMI, PERRLA, conjunctiva and sclera clear  NECK: Supple  CHEST/LUNG: Clear to auscultation bilaterally; No wheeze; Left upper chest PPM extraction site intact, mildly tender.   HEART: Irregular rate and rhythm; No murmurs, rubs, or gallops  ABDOMEN: Soft, Nontender, Nondistended; Bowel sounds present  EXTREMITIES: No clubbing, cyanosis, or edema  PSYCH/Neuro: AAOx3. Non-focal.       LABS:                        11.6   7.95  )-----------( 164      ( 20 Jun 2018 08:14 )             35.0     06-20    136  |  99  |  19  ----------------------------<  157<H>  4.7   |  25  |  1.29    Ca    8.7      20 Jun 2018 10:26  Phos  3.3     06-20  Mg     1.7     06-20      PT/INR - ( 20 Jun 2018 10:26 )   PT: 41.9 sec;   INR: 3.74 ratio         PTT - ( 20 Jun 2018 10:26 )  PTT:45.9 sec  CARDIAC MARKERS ( 20 Jun 2018 06:58 )  x     / x     / 182 U/L / x     / x          < from: Xray Chest 1 View- PORTABLE-Urgent (06.19.18 @ 21:02) >  IMPRESSION:   Clear lungs. No pneumothorax.     < end of copied text >      Telemetry reviewed: Vpaced 60-70's. 6 beats of WCT.     RADIOLOGY & ADDITIONAL TESTS:    Imaging Personally Reviewed: CXR report.   Consultant(s) Notes Reviewed:  EPS, ID  Care Discussed with Consultants/Other Providers: Dr. Alegria and Dr. Butcher
RYAN GONSALVES 58y [id number]    Patient is a 58y old  Male who presents with a chief complaint of lead extraction (14 Jun 2018 15:54)      Follow Up/CC:  ID following for bacteremia    Interval History/ROS: feels well, for OR tomorrow     Allergies    No Known Allergies    Intolerances        ANTIMICROBIALS:  DAPTOmycin IVPB    DAPTOmycin IVPB 500 every 24 hours      MEDICATIONS  (STANDING):  carvedilol 12.5 milliGRAM(s) Oral every 12 hours  cholecalciferol 1000 Unit(s) Oral daily  DAPTOmycin IVPB      DAPTOmycin IVPB 500 milliGRAM(s) IV Intermittent every 24 hours  digoxin     Tablet 0.125 milliGRAM(s) Oral daily  eplerenone 25 milliGRAM(s) Oral daily  heparin  Infusion.  Unit(s)/Hr (11 mL/Hr) IV Continuous <Continuous>  lactobacillus acidophilus 1 Tablet(s) Oral two times a day with meals  multivitamin 1 Tablet(s) Oral daily  sacubitril 49 mG/valsartan 51 mG 1 Tablet(s) Oral two times a day  tamsulosin 0.4 milliGRAM(s) Oral at bedtime  warfarin 10 milliGRAM(s) Oral once    MEDICATIONS  (PRN):  heparin  Injectable 5000 Unit(s) IV Push every 6 hours PRN For aPTT less than 40  heparin  Injectable 2500 Unit(s) IV Push every 6 hours PRN For aPTT between 40 - 57        Vital Signs Last 24 Hrs  T(C): 36.3 (18 Jun 2018 14:02), Max: 36.6 (17 Jun 2018 20:45)  T(F): 97.4 (18 Jun 2018 14:02), Max: 97.9 (18 Jun 2018 05:13)  HR: 59 (18 Jun 2018 14:02) (59 - 63)  BP: 92/49 (18 Jun 2018 14:02) (92/49 - 94/67)  BP(mean): --  RR: 18 (18 Jun 2018 14:02) (18 - 18)  SpO2: 97% (18 Jun 2018 14:02) (96% - 97%)    CBC Full  -  ( 18 Jun 2018 07:00 )  WBC Count : 8.24 K/uL  Hemoglobin : 13.5 g/dL  Hematocrit : 38.4 %  Platelet Count - Automated : 199 K/uL  Mean Cell Volume : 92.1 fl  Mean Cell Hemoglobin : 32.4 pg  Mean Cell Hemoglobin Concentration : 35.2 gm/dL  Auto Neutrophil # : x  Auto Lymphocyte # : x  Auto Monocyte # : x  Auto Eosinophil # : x  Auto Basophil # : x  Auto Neutrophil % : x  Auto Lymphocyte % : x  Auto Monocyte % : x  Auto Eosinophil % : x  Auto Basophil % : x    06-17    138  |  101  |  19  ----------------------------<  90  4.3   |  25  |  1.13    Ca    8.5      17 Jun 2018 08:39            MICROBIOLOGY:  .Blood Blood  06-14-18   No growth to date.  --  --      RADIOLOGY
Patient is a 58y old  Male who presents with a chief complaint of lead extraction (14 Jun 2018 15:54)        SUBJECTIVE / OVERNIGHT EVENTS: Patient denies any pain, SOB, fevers, or N/V.       MEDICATIONS  (STANDING):  carvedilol 12.5 milliGRAM(s) Oral every 12 hours  cholecalciferol 1000 Unit(s) Oral daily  DAPTOmycin IVPB      DAPTOmycin IVPB 500 milliGRAM(s) IV Intermittent every 24 hours  digoxin     Tablet 0.125 milliGRAM(s) Oral daily  eplerenone 25 milliGRAM(s) Oral daily  heparin  Infusion.  Unit(s)/Hr (11 mL/Hr) IV Continuous <Continuous>  lactobacillus acidophilus 1 Tablet(s) Oral two times a day with meals  multivitamin 1 Tablet(s) Oral daily  sacubitril 49 mG/valsartan 51 mG 1 Tablet(s) Oral two times a day  tamsulosin 0.4 milliGRAM(s) Oral at bedtime  warfarin 6 milliGRAM(s) Oral once    MEDICATIONS  (PRN):  heparin  Injectable 5000 Unit(s) IV Push every 6 hours PRN For aPTT less than 40  heparin  Injectable 2500 Unit(s) IV Push every 6 hours PRN For aPTT between 40 - 57      Vital Signs Last 24 Hrs  T(C): 36.4 (17 Jun 2018 12:35), Max: 36.8 (16 Jun 2018 20:27)  T(F): 97.6 (17 Jun 2018 12:35), Max: 98.3 (16 Jun 2018 20:27)  HR: 61 (17 Jun 2018 12:35) (60 - 64)  BP: 90/63 (17 Jun 2018 12:35) (90/56 - 96/69)  BP(mean): --  RR: 18 (17 Jun 2018 12:35) (18 - 18)  SpO2: 96% (17 Jun 2018 12:35) (94% - 97%)  CAPILLARY BLOOD GLUCOSE        I&O's Summary    16 Jun 2018 07:01  -  17 Jun 2018 07:00  --------------------------------------------------------  IN: 1205 mL / OUT: 0 mL / NET: 1205 mL    17 Jun 2018 07:01  -  17 Jun 2018 14:55  --------------------------------------------------------  IN: 600 mL / OUT: 0 mL / NET: 600 mL          PHYSICAL EXAM:  GENERAL: NAD, well-developed  HEAD:  Atraumatic, Normocephalic  EYES: EOMI, PERRLA, conjunctiva and sclera clear  NECK: Supple  CHEST/LUNG: Clear to auscultation bilaterally; No wheeze  HEART: Irregular rate and rhythm; No murmurs, rubs, or gallops  ABDOMEN: Soft, Nontender, Nondistended; Bowel sounds present  EXTREMITIES: No clubbing, cyanosis, or edema  PSYCH/Neuro: AAOx3. Non-focal.   SKIN: No rashes or lesions      LABS:                        13.8   8.04  )-----------( 210      ( 17 Jun 2018 11:45 )             39.5     06-17    138  |  101  |  19  ----------------------------<  90  4.3   |  25  |  1.13    Ca    8.5      17 Jun 2018 08:39      PT/INR - ( 17 Jun 2018 08:39 )   PT: 20.9 sec;   INR: 1.91 ratio         PTT - ( 17 Jun 2018 08:39 )  PTT:79.3 sec        RADIOLOGY & ADDITIONAL TESTS:    Imaging Personally Reviewed:   Consultant(s) Notes Reviewed: EPS, ID   Care Discussed with Consultants/Other Providers:
RYAN GONSALVES 58y [id number]    Patient is a 58y old  Male who presents with a chief complaint of lead extraction (14 Jun 2018 15:54)      Follow Up/CC:  ID following for bacteremia    Interval History/ROS: feels well, for OR today    Allergies    No Known Allergies    Intolerances        ANTIMICROBIALS:  DAPTOmycin IVPB    DAPTOmycin IVPB 500 every 24 hours      MEDICATIONS  (STANDING):  carvedilol 12.5 milliGRAM(s) Oral every 12 hours  cholecalciferol 1000 Unit(s) Oral daily  DAPTOmycin IVPB      DAPTOmycin IVPB 500 milliGRAM(s) IV Intermittent every 24 hours  digoxin     Tablet 0.125 milliGRAM(s) Oral daily  eplerenone 25 milliGRAM(s) Oral daily  heparin  Infusion.  Unit(s)/Hr (11 mL/Hr) IV Continuous <Continuous>  lactobacillus acidophilus 1 Tablet(s) Oral two times a day with meals  multivitamin 1 Tablet(s) Oral daily  sacubitril 49 mG/valsartan 51 mG 1 Tablet(s) Oral two times a day  tamsulosin 0.4 milliGRAM(s) Oral at bedtime    MEDICATIONS  (PRN):  heparin  Injectable 5000 Unit(s) IV Push every 6 hours PRN For aPTT less than 40  heparin  Injectable 2500 Unit(s) IV Push every 6 hours PRN For aPTT between 40 - 57        Vital Signs Last 24 Hrs  T(C): 36.4 (19 Jun 2018 11:08), Max: 36.8 (18 Jun 2018 20:56)  T(F): 97.6 (19 Jun 2018 11:08), Max: 98.2 (18 Jun 2018 20:56)  HR: 77 (19 Jun 2018 11:08) (59 - 77)  BP: 102/70 (19 Jun 2018 11:08) (87/57 - 102/70)  BP(mean): --  RR: 18 (19 Jun 2018 11:08) (18 - 18)  SpO2: 98% (19 Jun 2018 11:08) (97% - 99%)    CBC Full  -  ( 19 Jun 2018 08:18 )  WBC Count : 7.41 K/uL  Hemoglobin : 12.8 g/dL  Hematocrit : 38.8 %  Platelet Count - Automated : 190 K/uL  Mean Cell Volume : 91.3 fl  Mean Cell Hemoglobin : 30.1 pg  Mean Cell Hemoglobin Concentration : 33.0 gm/dL  Auto Neutrophil # : x  Auto Lymphocyte # : x  Auto Monocyte # : x  Auto Eosinophil # : x  Auto Basophil # : x  Auto Neutrophil % : x  Auto Lymphocyte % : x  Auto Monocyte % : x  Auto Eosinophil % : x  Auto Basophil % : x    06-19    135  |  100  |  18  ----------------------------<  88  4.2   |  23  |  1.14    Ca    9.0      19 Jun 2018 06:40      Creatine Kinase, Serum in AM (06.15.18 @ 06:00)    Creatine Kinase, Serum: 52 U/L        MICROBIOLOGY:  .Blood Blood  06-14-18   No growth to date.  --  --    RADIOLOGY
RYAN GONSALVES 58y [id number]    Patient is a 58y old  Male who presents with a chief complaint of lead extraction (14 Jun 2018 15:54)      Follow Up/CC:  ID following for bacteremia    Interval History/ROS: s/p device extraction    Allergies    No Known Allergies    Intolerances        ANTIMICROBIALS:  DAPTOmycin IVPB    DAPTOmycin IVPB 500 every 24 hours      MEDICATIONS  (STANDING):  acetaminophen   Tablet. 650 milliGRAM(s) Oral once  carvedilol 12.5 milliGRAM(s) Oral every 12 hours  cholecalciferol 1000 Unit(s) Oral daily  DAPTOmycin IVPB      DAPTOmycin IVPB 500 milliGRAM(s) IV Intermittent every 24 hours  digoxin     Tablet 0.125 milliGRAM(s) Oral daily  eplerenone 25 milliGRAM(s) Oral daily  lactobacillus acidophilus 1 Tablet(s) Oral two times a day with meals  multivitamin 1 Tablet(s) Oral daily  sacubitril 49 mG/valsartan 51 mG 1 Tablet(s) Oral two times a day  tamsulosin 0.4 milliGRAM(s) Oral at bedtime    MEDICATIONS  (PRN):        Vital Signs Last 24 Hrs  T(C): 37 (20 Jun 2018 11:18), Max: 37 (20 Jun 2018 11:18)  T(F): 98.6 (20 Jun 2018 11:18), Max: 98.6 (20 Jun 2018 11:18)  HR: 70 (20 Jun 2018 11:18) (64 - 70)  BP: 82/50 (20 Jun 2018 11:18) (82/50 - 113/58)  BP(mean): 81 (19 Jun 2018 23:00) (79 - 81)  RR: 18 (20 Jun 2018 11:18) (14 - 18)  SpO2: 96% (20 Jun 2018 11:18) (95% - 99%)    CBC Full  -  ( 20 Jun 2018 08:14 )  WBC Count : 7.95 K/uL  Hemoglobin : 11.6 g/dL  Hematocrit : 35.0 %  Platelet Count - Automated : 164 K/uL  Mean Cell Volume : 91.1 fl  Mean Cell Hemoglobin : 30.2 pg  Mean Cell Hemoglobin Concentration : 33.1 gm/dL  Auto Neutrophil # : x  Auto Lymphocyte # : x  Auto Monocyte # : x  Auto Eosinophil # : x  Auto Basophil # : x  Auto Neutrophil % : x  Auto Lymphocyte % : x  Auto Monocyte % : x  Auto Eosinophil % : x  Auto Basophil % : x    06-20    136  |  99  |  19  ----------------------------<  157<H>  4.7   |  25  |  1.29    Ca    8.7      20 Jun 2018 10:26  Phos  3.3     06-20  Mg     1.7     06-20            MICROBIOLOGY:  .Blood Blood  06-14-18   No growth at 5 days.  --  --              v            RADIOLOGY

## 2018-06-21 NOTE — PROGRESS NOTE ADULT - PROBLEM SELECTOR PROBLEM 4
Atrial fibrillation

## 2018-06-21 NOTE — PROGRESS NOTE ADULT - PROBLEM SELECTOR PLAN 4
-C/w Coreg and digoxin.  -INR today 4.90.   -Will hold coumadin for tonight and tomorrow night, then patient is advised to take coumadin 4.5mg at bedtime until he follows up with his doctor for lab testing for subsequent dosing. Says he will get his labs checked on Friday and then again on Monday. 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  -Monitor coags. Patient says his INR outpatient goal by his cardiologist is usually ~3.5-4.

## 2018-06-21 NOTE — PROGRESS NOTE ADULT - PROBLEM SELECTOR PLAN 5
-C/w Entresto, eplerenone, digoxin, and Coreg.  -C/w low sodium diet.  -BP running a little low, but patient asymptomatic. Follow up outpatient.

## 2018-06-21 NOTE — PROGRESS NOTE ADULT - PROBLEM SELECTOR PROBLEM 1
Infection of implantable cardioverter-defibrillator (ICD) lead, initial encounter
Staphylococcal infection

## 2018-06-21 NOTE — PROGRESS NOTE ADULT - PROBLEM SELECTOR PLAN 8
-Low sodium diet.
-DASH diet.
-Low sodium diet.
-Low sodium diet.    9. Dispo: -DC planned for today. Follow up with EPS and ID outpatient.
-Low sodium diet.    9. Dispo: -DC tentatively planned for tomorrow after daptomycin dose. Patient should have his INR checked on Friday or Saturday and follow up with his outpatient doctors for coumadin dosing.
-Low sodium diet. NPO at [facility] for procedure tomorrow.
-Low sodium diet. NPO for procedure today.

## 2018-06-21 NOTE — PROGRESS NOTE ADULT - ASSESSMENT
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 Pt was admitted in April of 2018 to [facility] where he was found to have persistent MSSA bacteremia and has been on Daptomycin since then. BiV ICD interrogated 6/14/18 revealed patient is pacemaker dependent.   # MSSA bacteremia    - s/p ICD extraction and placement of leadless PPM 6/19/18;   -Cxr reveals micra in appropriate position   - bilateral groin sites currently benign; activity restrictions reinforced with patient  -Post PPM teaching reviewed with patient.    -Patient advised that if  fever or swelling/redness/discharge including bleed from surgical sites he should call his Private EP Dr Forrester  -c/w  antimicrobial therapy per ID recommendation ; patient will require 4 weeks of IV abx therapy after which he will follow up for ICD reimplantation with Dr Butcher; patient and wife verbalizes understanding.   - final blood culture result sent 6/14/18 negative  - 6/19 OR cultures; no growth to date.   - AC: On coumadin, INR 4.9 today; c/w bleeding precautions  - Life vest placement and teaching done by rep  -No EP objection to discharge today with follow up with Dr Forrester for wound and device checks. Patient to make appointment for follow up with Dr Butcher for ICD reimplantation after he is cleared by ID.     [id number]

## 2018-06-23 RX ORDER — WARFARIN SODIUM 2.5 MG/1
6 TABLET ORAL
Qty: 0 | Refills: 0 | COMMUNITY
Start: 2018-06-23 | End: 2018-06-27

## 2018-06-23 RX ORDER — WARFARIN SODIUM 2.5 MG/1
4.5 TABLET ORAL
Qty: 22.5 | Refills: 0 | OUTPATIENT
Start: 2018-06-23 | End: 2018-06-27

## 2018-06-25 LAB
CULTURE RESULTS: NO GROWTH — SIGNIFICANT CHANGE UP
CULTURE RESULTS: SIGNIFICANT CHANGE UP
SPECIMEN SOURCE: SIGNIFICANT CHANGE UP
SPECIMEN SOURCE: SIGNIFICANT CHANGE UP

## 2018-07-18 LAB
CULTURE RESULTS: SIGNIFICANT CHANGE UP
CULTURE RESULTS: SIGNIFICANT CHANGE UP
SPECIMEN SOURCE: SIGNIFICANT CHANGE UP
SPECIMEN SOURCE: SIGNIFICANT CHANGE UP

## 2018-07-25 PROBLEM — Q24.6 CONGENITAL HEART BLOCK: Chronic | Status: ACTIVE | Noted: 2018-06-14

## 2018-07-25 PROBLEM — I48.91 UNSPECIFIED ATRIAL FIBRILLATION: Chronic | Status: ACTIVE | Noted: 2018-06-14

## 2018-07-25 PROBLEM — N40.0 BENIGN PROSTATIC HYPERPLASIA WITHOUT LOWER URINARY TRACT SYMPTOMS: Chronic | Status: ACTIVE | Noted: 2018-06-14

## 2018-07-25 PROBLEM — I50.9 HEART FAILURE, UNSPECIFIED: Chronic | Status: ACTIVE | Noted: 2018-06-14

## 2018-09-12 ENCOUNTER — INPATIENT (INPATIENT)
Facility: HOSPITAL | Age: 58
LOS: 5 days | Discharge: HOME | DRG: 226 | End: 2018-09-18
Attending: HOSPITALIST | Admitting: INTERNAL MEDICINE
Payer: COMMERCIAL

## 2018-09-12 VITALS — HEIGHT: 68 IN | WEIGHT: 145.06 LBS

## 2018-09-12 DIAGNOSIS — I45.9 CONDUCTION DISORDER, UNSPECIFIED: ICD-10-CM

## 2018-09-12 DIAGNOSIS — Z95.2 PRESENCE OF PROSTHETIC HEART VALVE: Chronic | ICD-10-CM

## 2018-09-12 DIAGNOSIS — T82.9XXA UNSPECIFIED COMPLICATION OF CARDIAC AND VASCULAR PROSTHETIC DEVICE, IMPLANT AND GRAFT, INITIAL ENCOUNTER: Chronic | ICD-10-CM

## 2018-09-12 LAB
ANION GAP SERPL CALC-SCNC: 12 MMOL/L — SIGNIFICANT CHANGE UP (ref 5–17)
APTT BLD: 61.6 SEC — HIGH (ref 27.5–37.4)
BLD GP AB SCN SERPL QL: NEGATIVE — SIGNIFICANT CHANGE UP
BUN SERPL-MCNC: 21 MG/DL — SIGNIFICANT CHANGE UP (ref 7–23)
CALCIUM SERPL-MCNC: 10.1 MG/DL — SIGNIFICANT CHANGE UP (ref 8.4–10.5)
CHLORIDE SERPL-SCNC: 98 MMOL/L — SIGNIFICANT CHANGE UP (ref 96–108)
CO2 SERPL-SCNC: 26 MMOL/L — SIGNIFICANT CHANGE UP (ref 22–31)
CREAT SERPL-MCNC: 1.43 MG/DL — HIGH (ref 0.5–1.3)
GLUCOSE SERPL-MCNC: 88 MG/DL — SIGNIFICANT CHANGE UP (ref 70–99)
HCT VFR BLD CALC: 45.2 % — SIGNIFICANT CHANGE UP (ref 39–50)
HGB BLD-MCNC: 15.4 G/DL — SIGNIFICANT CHANGE UP (ref 13–17)
INR BLD: 3.17 RATIO — HIGH (ref 0.88–1.16)
MCHC RBC-ENTMCNC: 30.8 PG — SIGNIFICANT CHANGE UP (ref 27–34)
MCHC RBC-ENTMCNC: 34.2 GM/DL — SIGNIFICANT CHANGE UP (ref 32–36)
MCV RBC AUTO: 90.2 FL — SIGNIFICANT CHANGE UP (ref 80–100)
PLATELET # BLD AUTO: 188 K/UL — SIGNIFICANT CHANGE UP (ref 150–400)
POTASSIUM SERPL-MCNC: 4.8 MMOL/L — SIGNIFICANT CHANGE UP (ref 3.5–5.3)
POTASSIUM SERPL-SCNC: 4.8 MMOL/L — SIGNIFICANT CHANGE UP (ref 3.5–5.3)
PROTHROM AB SERPL-ACNC: 35.4 SEC — HIGH (ref 9.8–12.7)
RBC # BLD: 5.01 M/UL — SIGNIFICANT CHANGE UP (ref 4.2–5.8)
RBC # FLD: 12.6 % — SIGNIFICANT CHANGE UP (ref 10.3–14.5)
RH IG SCN BLD-IMP: POSITIVE — SIGNIFICANT CHANGE UP
SODIUM SERPL-SCNC: 136 MMOL/L — SIGNIFICANT CHANGE UP (ref 135–145)
WBC # BLD: 7.7 K/UL — SIGNIFICANT CHANGE UP (ref 3.8–10.5)
WBC # FLD AUTO: 7.7 K/UL — SIGNIFICANT CHANGE UP (ref 3.8–10.5)

## 2018-09-12 PROCEDURE — 99024 POSTOP FOLLOW-UP VISIT: CPT

## 2018-09-12 PROCEDURE — 93010 ELECTROCARDIOGRAM REPORT: CPT

## 2018-09-12 RX ORDER — SACUBITRIL AND VALSARTAN 24; 26 MG/1; MG/1
1 TABLET, FILM COATED ORAL
Qty: 0 | Refills: 0 | Status: DISCONTINUED | OUTPATIENT
Start: 2018-09-12 | End: 2018-09-13

## 2018-09-12 RX ORDER — ASCORBIC ACID 60 MG
1000 TABLET,CHEWABLE ORAL DAILY
Qty: 0 | Refills: 0 | Status: DISCONTINUED | OUTPATIENT
Start: 2018-09-12 | End: 2018-09-18

## 2018-09-12 RX ORDER — CARVEDILOL PHOSPHATE 80 MG/1
12.5 CAPSULE, EXTENDED RELEASE ORAL EVERY 12 HOURS
Qty: 0 | Refills: 0 | Status: DISCONTINUED | OUTPATIENT
Start: 2018-09-12 | End: 2018-09-13

## 2018-09-12 RX ORDER — TAMSULOSIN HYDROCHLORIDE 0.4 MG/1
0.4 CAPSULE ORAL AT BEDTIME
Qty: 0 | Refills: 0 | Status: DISCONTINUED | OUTPATIENT
Start: 2018-09-12 | End: 2018-09-18

## 2018-09-12 RX ORDER — L.ACIDOPH/B.ANIMALIS/B.LONGUM 15B CELL
1 CAPSULE ORAL
Qty: 0 | Refills: 0 | COMMUNITY

## 2018-09-12 RX ORDER — DIGOXIN 250 MCG
0.12 TABLET ORAL DAILY
Qty: 0 | Refills: 0 | Status: DISCONTINUED | OUTPATIENT
Start: 2018-09-12 | End: 2018-09-18

## 2018-09-12 RX ORDER — CHOLECALCIFEROL (VITAMIN D3) 125 MCG
1 CAPSULE ORAL
Qty: 0 | Refills: 0 | COMMUNITY

## 2018-09-12 RX ORDER — EPLERENONE 50 MG/1
1 TABLET, FILM COATED ORAL
Qty: 0 | Refills: 0 | COMMUNITY

## 2018-09-12 RX ORDER — MULTIVIT-MIN/FERROUS GLUCONATE 9 MG/15 ML
1 LIQUID (ML) ORAL
Qty: 0 | Refills: 0 | COMMUNITY

## 2018-09-12 RX ORDER — MAGNESIUM CARBONATE 54 MG/5 ML
1 LIQUID (ML) ORAL
Qty: 0 | Refills: 0 | COMMUNITY

## 2018-09-12 RX ORDER — CHOLECALCIFEROL (VITAMIN D3) 125 MCG
2000 CAPSULE ORAL DAILY
Qty: 0 | Refills: 0 | Status: DISCONTINUED | OUTPATIENT
Start: 2018-09-12 | End: 2018-09-18

## 2018-09-12 RX ORDER — ASPIRIN/CALCIUM CARB/MAGNESIUM 324 MG
81 TABLET ORAL DAILY
Qty: 0 | Refills: 0 | Status: DISCONTINUED | OUTPATIENT
Start: 2018-09-12 | End: 2018-09-18

## 2018-09-12 RX ADMIN — CARVEDILOL PHOSPHATE 12.5 MILLIGRAM(S): 80 CAPSULE, EXTENDED RELEASE ORAL at 17:55

## 2018-09-12 RX ADMIN — Medication 1000 MILLIGRAM(S): at 17:55

## 2018-09-12 RX ADMIN — TAMSULOSIN HYDROCHLORIDE 0.4 MILLIGRAM(S): 0.4 CAPSULE ORAL at 17:55

## 2018-09-12 RX ADMIN — SACUBITRIL AND VALSARTAN 1 TABLET(S): 24; 26 TABLET, FILM COATED ORAL at 17:55

## 2018-09-12 NOTE — H&P CARDIOLOGY - FAMILY HISTORY
No pertinent family history in first degree relatives Father  Still living? No  Family history of hypertension, Age at diagnosis: Age Unknown     Grandparent  Still living? No  Family history of stroke (cerebrovascular), Age at diagnosis: Age Unknown

## 2018-09-12 NOTE — H&P CARDIOLOGY - PSH
H/O heart valve replacement with mechanical valve Disorder of cardiac pacemaker system  ICD Medtronic 2/2/15  ICD St alejandro 2/2/15   ICD abandoned  Medtronic? implanted 4/13/11  LV lead Medtronic implanted 4/13/11   RV lead abandoned implanted 5/10/90  RA lead Implanted 5/10/90  H/O heart valve replacement with mechanical valve

## 2018-09-12 NOTE — CHART NOTE - NSCHARTNOTEFT_GEN_A_CORE
Spoke to Dr. Butcher today via telephone call regarding pt INR 3.17 today as per MD hold Heparin gtt , Hold Coumadin, Hold Antibiotics at this time . Repeat PT /PTT in am . Dr Butcher will follow up in am . Pt made aware at this time. Stable no complaints any Cp sob or palpitations .

## 2018-09-12 NOTE — H&P CARDIOLOGY - HISTORY OF PRESENT ILLNESS
59 yo male with pmhx of congential heart block s/p PPM in 1990 with upgrade to AICD in 2015, mitral valve replacement on Coumadin, HFrEF, BPH, afib, who presents for elective lead extraction for concern of infected AICD.  Pt was admitted in April of 2018 to [facility] where he was found to have persistent MSSA bacteremia thought to be possibly 2/2 infected PPM leads and has been on Daptomycin since then.  Pt had seen Dr. Butcher in the clinic last week and was instructed to come to [facility] today for planned lead extraction tomorrow.  Currently pt denies any CP, SOB, n/v, fevers or chills. This is a 59 yo   male with pmhx of HFrEF, Afib, TIAs, Congential heart block s/p PPM in 1990 with upgrade to AICD in 2015, mitral valve replacement on Coumadin (last taken Monday 9/10/18)  in 2000 ,mechanical aortic valve replacement in 2000, in 2011 pt was upgraded to biventricular ICD from a PPM. Pt had generator change in 2015. At that time pt noted to have a high impedance of his SVC coil and his old ICD lead was abandoned with placement of new lead .   Pt was admitted in April of 2018 to [facility] where he was found to have persistent MSSA bacteremia thought to be possibly 2/2 infected PPM leads and has been on Daptomycin since then and had a PICC line from April to end of July that became infected and was removed in July 2018.  Pt had echocardiogram in April 19 2018 which revealed left ventricular size and function with a moderately dilated LV and moderate to severe global hypokinesis with EF 25%. No vegetations noted. CT scan done showed trace pleural effusion and minimal left lung atelectasis and consolidation. PICC line placed with plan 6 weeks antibiotics. (d/c in July 2018 due to infection)   Pt had seen Dr. Butcher and was instructed to come to [facility] today for planned AICD implantation pt currently on Life Vest .  Currently pt denies any CP, SOB, n/v, fevers or chills.    < from: Intra-Operative Transesophageal Echo (06.19.18 @ 19:00) >  Conclusions:  1. Mechanical mitral valve prosthesis. Appears to be seated  well with normal leaftlet motion. No perivalvular or  intravalvular leaks seen.  Washing jets seen. Mean gradient  of 2mmHg.  Likely normal in setting of mechanical mitral  valve  2. Normal trileaflet aortic valve. Minimal aortic  regurgitation.  3. Normal aortic root.  Grade III descending aorta with ring down effect.  4. Severe left atrial enlargement.  Left atrial appendage  thrombus seen. Spontaneous echo contrast seen.  5. Normal right atrium. A device wire is noted in the right  heart.  6. Normal right ventricular size and function.  7. Normal tricuspid valve. Mild tricuspid regurgitation.  Findings dicussed with electrophysiologist.  Confirmed on  6/19/2018 - 19:46:34 by Phil Zhao M.D.  ------------------------------------------------------------------------    < end of copied text > This is a 57 yo   male with pmhx of HFrEF currently on LIFE VEST compliant , Afib, TIAs, Congential heart block s/p PPM in 1990 with upgrade to AICD in 2015, mitral valve replacement on Coumadin (last taken Monday 9/10/18)  in 2000 ,mechanical aortic valve replacement in 2000, in 2011 pt was upgraded to biventricular ICD from a PPM. Pt had generator change in 2015. At that time pt noted to have a high impedance of his SVC coil and his old ICD lead was abandoned with placement of new lead .   Pt was admitted in April of 2018 to [facility] where he was found to have persistent MSSA bacteremia thought to be possibly 2/2 infected PPM leads and has been on Daptomycin since then and had a PICC line from April to end of July that became infected and was removed in July 2018.  Pt had echocardiogram in April 19 2018 which revealed left ventricular size and function with a moderately dilated LV and moderate to severe global hypokinesis with EF 25%. No vegetations noted. CT scan done showed trace pleural effusion and minimal left lung atelectasis and consolidation. PICC line placed with plan 6 weeks antibiotics. (d/c in July 2018 due to infection)   Pt had seen Dr. Butcher and was instructed to come to [facility] today for planned AICD implantation pt currently on Life Vest .  Currently pt denies any CP, SOB, n/v, fevers or chills.    < from: Intra-Operative Transesophageal Echo (06.19.18 @ 19:00) >  Conclusions:  1. Mechanical mitral valve prosthesis. Appears to be seated  well with normal leaftlet motion. No perivalvular or  intravalvular leaks seen.  Washing jets seen. Mean gradient  of 2mmHg.  Likely normal in setting of mechanical mitral  valve  2. Normal trileaflet aortic valve. Minimal aortic  regurgitation.  3. Normal aortic root.  Grade III descending aorta with ring down effect.  4. Severe left atrial enlargement.  Left atrial appendage  thrombus seen. Spontaneous echo contrast seen.  5. Normal right atrium. A device wire is noted in the right  heart.  6. Normal right ventricular size and function.  7. Normal tricuspid valve. Mild tricuspid regurgitation.  Findings dicussed with electrophysiologist.  Confirmed on  6/19/2018 - 19:46:34 by Phil Zhao M.D.  ------------------------------------------------------------------------    < end of copied text > This is a 59 yo   male with pmhx of HFrEF currently on LIFE VEST compliant , Afib, TIAs, Congential heart block s/p PPM in 1990 with upgrade to AICD in 2015, mitral valve replacement in 2000, on Coumadin (last taken Monday 9/10/18)  ,mechanical aortic valve replacement in 2000, in 2011 pt was upgraded to biventricular ICD from a PPM. Pt had generator change in 2015. At that time pt noted to have a high impedance of his SVC coil and his old ICD lead was abandoned with placement of new lead .  Pt was admitted in April of 2018 to [facility] where he was found to have persistent MSSA bacteremia thought to be possibly 2/2 infected PPM leads and has been on Daptomycin since then and had a PICC line from April to end of July that became infected and was removed in July 2018.  Pt had echocardiogram in April 19 2018 which revealed left ventricular size and function with a moderately dilated LV and moderate to severe global hypokinesis with EF 25%. No vegetations noted. CT scan done showed trace pleural effusion and minimal left lung atelectasis and consolidation. PICC line placed with plan 6 weeks antibiotics. (d/c in July 2018 due to infection)   Pt had seen Dr. Butcher and was instructed to come to [facility] today for planned AICD implantation possible Micra PPM extraction  pt currently on Life Vest .  Currently pt denies any CP, SOB, n/v, fevers or chills.    < from: Intra-Operative Transesophageal Echo (06.19.18 @ 19:00) >  Conclusions:  1. Mechanical mitral valve prosthesis. Appears to be seated  well with normal leaftlet motion. No perivalvular or  intravalvular leaks seen.  Washing jets seen. Mean gradient  of 2mmHg.  Likely normal in setting of mechanical mitral  valve  2. Normal trileaflet aortic valve. Minimal aortic  regurgitation.  3. Normal aortic root.  Grade III descending aorta with ring down effect.  4. Severe left atrial enlargement.  Left atrial appendage  thrombus seen. Spontaneous echo contrast seen.  5. Normal right atrium. A device wire is noted in the right  heart.  6. Normal right ventricular size and function.  7. Normal tricuspid valve. Mild tricuspid regurgitation.  Findings dicussed with electrophysiologist.  Confirmed on  6/19/2018 - 19:46:34 by Phil Zhao M.D.  ------------------------------------------------------------------------    < end of copied text >

## 2018-09-12 NOTE — H&P CARDIOLOGY - PMH
Atrial fibrillation    BPH (benign prostatic hyperplasia)    Congenital heart block    Congestive heart failure Atrial fibrillation    BPH (benign prostatic hyperplasia)    Congenital heart block    Congestive heart failure    Pacemaker  Micra PPM

## 2018-09-13 LAB
ANION GAP SERPL CALC-SCNC: 12 MMOL/L — SIGNIFICANT CHANGE UP (ref 5–17)
APTT BLD: 62.9 SEC — HIGH (ref 27.5–37.4)
APTT BLD: 78.3 SEC — HIGH (ref 27.5–37.4)
BUN SERPL-MCNC: 18 MG/DL — SIGNIFICANT CHANGE UP (ref 7–23)
CALCIUM SERPL-MCNC: 9.2 MG/DL — SIGNIFICANT CHANGE UP (ref 8.4–10.5)
CHLORIDE SERPL-SCNC: 100 MMOL/L — SIGNIFICANT CHANGE UP (ref 96–108)
CO2 SERPL-SCNC: 26 MMOL/L — SIGNIFICANT CHANGE UP (ref 22–31)
CREAT SERPL-MCNC: 1.26 MG/DL — SIGNIFICANT CHANGE UP (ref 0.5–1.3)
GLUCOSE SERPL-MCNC: 95 MG/DL — SIGNIFICANT CHANGE UP (ref 70–99)
HCT VFR BLD CALC: 44.9 % — SIGNIFICANT CHANGE UP (ref 39–50)
HCT VFR BLD CALC: 46.7 % — SIGNIFICANT CHANGE UP (ref 39–50)
HGB BLD-MCNC: 15.2 G/DL — SIGNIFICANT CHANGE UP (ref 13–17)
HGB BLD-MCNC: 15.7 G/DL — SIGNIFICANT CHANGE UP (ref 13–17)
INR BLD: 2.52 RATIO — HIGH (ref 0.88–1.16)
MCHC RBC-ENTMCNC: 30.5 PG — SIGNIFICANT CHANGE UP (ref 27–34)
MCHC RBC-ENTMCNC: 30.9 PG — SIGNIFICANT CHANGE UP (ref 27–34)
MCHC RBC-ENTMCNC: 33.5 GM/DL — SIGNIFICANT CHANGE UP (ref 32–36)
MCHC RBC-ENTMCNC: 33.8 GM/DL — SIGNIFICANT CHANGE UP (ref 32–36)
MCV RBC AUTO: 91 FL — SIGNIFICANT CHANGE UP (ref 80–100)
MCV RBC AUTO: 91.4 FL — SIGNIFICANT CHANGE UP (ref 80–100)
PLATELET # BLD AUTO: 181 K/UL — SIGNIFICANT CHANGE UP (ref 150–400)
PLATELET # BLD AUTO: 181 K/UL — SIGNIFICANT CHANGE UP (ref 150–400)
POTASSIUM SERPL-MCNC: 4.2 MMOL/L — SIGNIFICANT CHANGE UP (ref 3.5–5.3)
POTASSIUM SERPL-SCNC: 4.2 MMOL/L — SIGNIFICANT CHANGE UP (ref 3.5–5.3)
PROTHROM AB SERPL-ACNC: 28 SEC — HIGH (ref 9.8–12.7)
RBC # BLD: 4.91 M/UL — SIGNIFICANT CHANGE UP (ref 4.2–5.8)
RBC # BLD: 5.13 M/UL — SIGNIFICANT CHANGE UP (ref 4.2–5.8)
RBC # FLD: 13 % — SIGNIFICANT CHANGE UP (ref 10.3–14.5)
RBC # FLD: 13.1 % — SIGNIFICANT CHANGE UP (ref 10.3–14.5)
SODIUM SERPL-SCNC: 138 MMOL/L — SIGNIFICANT CHANGE UP (ref 135–145)
WBC # BLD: 9.3 K/UL — SIGNIFICANT CHANGE UP (ref 3.8–10.5)
WBC # BLD: 9.9 K/UL — SIGNIFICANT CHANGE UP (ref 3.8–10.5)
WBC # FLD AUTO: 9.3 K/UL — SIGNIFICANT CHANGE UP (ref 3.8–10.5)
WBC # FLD AUTO: 9.9 K/UL — SIGNIFICANT CHANGE UP (ref 3.8–10.5)

## 2018-09-13 PROCEDURE — 71046 X-RAY EXAM CHEST 2 VIEWS: CPT | Mod: 26

## 2018-09-13 PROCEDURE — 70544 MR ANGIOGRAPHY HEAD W/O DYE: CPT | Mod: 26,59

## 2018-09-13 PROCEDURE — 70450 CT HEAD/BRAIN W/O DYE: CPT | Mod: 26

## 2018-09-13 PROCEDURE — 99255 IP/OBS CONSLTJ NEW/EST HI 80: CPT

## 2018-09-13 PROCEDURE — 70551 MRI BRAIN STEM W/O DYE: CPT | Mod: 26

## 2018-09-13 PROCEDURE — 70547 MR ANGIOGRAPHY NECK W/O DYE: CPT | Mod: 26

## 2018-09-13 RX ORDER — CARVEDILOL PHOSPHATE 80 MG/1
12.5 CAPSULE, EXTENDED RELEASE ORAL EVERY 12 HOURS
Qty: 0 | Refills: 0 | Status: DISCONTINUED | OUTPATIENT
Start: 2018-09-13 | End: 2018-09-18

## 2018-09-13 RX ORDER — SACUBITRIL AND VALSARTAN 24; 26 MG/1; MG/1
1 TABLET, FILM COATED ORAL
Qty: 0 | Refills: 0 | Status: DISCONTINUED | OUTPATIENT
Start: 2018-09-13 | End: 2018-09-13

## 2018-09-13 RX ORDER — CEFAZOLIN SODIUM 1 G
2000 VIAL (EA) INJECTION ONCE
Qty: 0 | Refills: 0 | Status: COMPLETED | OUTPATIENT
Start: 2018-09-13 | End: 2018-09-13

## 2018-09-13 RX ORDER — HEPARIN SODIUM 5000 [USP'U]/ML
4100 INJECTION INTRAVENOUS; SUBCUTANEOUS ONCE
Qty: 0 | Refills: 0 | Status: COMPLETED | OUTPATIENT
Start: 2018-09-13 | End: 2018-09-13

## 2018-09-13 RX ORDER — WARFARIN SODIUM 2.5 MG/1
5 TABLET ORAL ONCE
Qty: 0 | Refills: 0 | Status: COMPLETED | OUTPATIENT
Start: 2018-09-13 | End: 2018-09-13

## 2018-09-13 RX ORDER — HEPARIN SODIUM 5000 [USP'U]/ML
INJECTION INTRAVENOUS; SUBCUTANEOUS
Qty: 25000 | Refills: 0 | Status: DISCONTINUED | OUTPATIENT
Start: 2018-09-13 | End: 2018-09-15

## 2018-09-13 RX ORDER — SACUBITRIL AND VALSARTAN 24; 26 MG/1; MG/1
1 TABLET, FILM COATED ORAL
Qty: 0 | Refills: 0 | Status: DISCONTINUED | OUTPATIENT
Start: 2018-09-13 | End: 2018-09-18

## 2018-09-13 RX ORDER — CARVEDILOL PHOSPHATE 80 MG/1
12.5 CAPSULE, EXTENDED RELEASE ORAL EVERY 12 HOURS
Qty: 0 | Refills: 0 | Status: DISCONTINUED | OUTPATIENT
Start: 2018-09-13 | End: 2018-09-13

## 2018-09-13 RX ORDER — WARFARIN SODIUM 2.5 MG/1
7 TABLET ORAL ONCE
Qty: 0 | Refills: 0 | Status: COMPLETED | OUTPATIENT
Start: 2018-09-13 | End: 2018-09-13

## 2018-09-13 RX ORDER — HEPARIN SODIUM 5000 [USP'U]/ML
4100 INJECTION INTRAVENOUS; SUBCUTANEOUS EVERY 6 HOURS
Qty: 0 | Refills: 0 | Status: DISCONTINUED | OUTPATIENT
Start: 2018-09-13 | End: 2018-09-15

## 2018-09-13 RX ADMIN — WARFARIN SODIUM 5 MILLIGRAM(S): 2.5 TABLET ORAL at 08:11

## 2018-09-13 RX ADMIN — SACUBITRIL AND VALSARTAN 1 TABLET(S): 24; 26 TABLET, FILM COATED ORAL at 18:35

## 2018-09-13 RX ADMIN — HEPARIN SODIUM 750 UNIT(S)/HR: 5000 INJECTION INTRAVENOUS; SUBCUTANEOUS at 21:10

## 2018-09-13 RX ADMIN — HEPARIN SODIUM 750 UNIT(S)/HR: 5000 INJECTION INTRAVENOUS; SUBCUTANEOUS at 13:54

## 2018-09-13 RX ADMIN — WARFARIN SODIUM 7 MILLIGRAM(S): 2.5 TABLET ORAL at 21:09

## 2018-09-13 RX ADMIN — Medication 81 MILLIGRAM(S): at 05:42

## 2018-09-13 RX ADMIN — CARVEDILOL PHOSPHATE 12.5 MILLIGRAM(S): 80 CAPSULE, EXTENDED RELEASE ORAL at 18:35

## 2018-09-13 RX ADMIN — Medication 100 MILLIGRAM(S): at 09:01

## 2018-09-13 RX ADMIN — TAMSULOSIN HYDROCHLORIDE 0.4 MILLIGRAM(S): 0.4 CAPSULE ORAL at 21:09

## 2018-09-13 RX ADMIN — Medication 1000 MILLIGRAM(S): at 05:42

## 2018-09-13 RX ADMIN — HEPARIN SODIUM 800 UNIT(S)/HR: 5000 INJECTION INTRAVENOUS; SUBCUTANEOUS at 06:23

## 2018-09-13 RX ADMIN — Medication 2000 UNIT(S): at 05:42

## 2018-09-13 RX ADMIN — Medication 0.12 MILLIGRAM(S): at 05:42

## 2018-09-13 NOTE — PROVIDER CONTACT NOTE (OTHER) - SITUATION
code stroke called, as per protocol lab lets RN know platelet count which is 181. pt at CT now. NP aware. code stroke called, as per protocol lab lets RN know platelet count which is 181. pt at CT now. NP aware.  Bud French

## 2018-09-13 NOTE — CHART NOTE - NSCHARTNOTEFT_GEN_A_CORE
Alerted by RN that pt having Lt side facial drooping and slurred speech. Seen and examined at bed side. FS 97. Called Code stroke. CT of brain Rx.

## 2018-09-13 NOTE — PROVIDER CONTACT NOTE (CHANGE IN STATUS NOTIFICATION) - ASSESSMENT
A&OX4, /77 fs 97, facial droop noted on the left side & slurred speech, pt able to move all extremities without drift

## 2018-09-13 NOTE — CONSULT NOTE ADULT - ASSESSMENT
"Pt is a a 57 yo   male with pmhx of HFrEF currently on LIFE VEST compliant , Afib, TIAs, Congential heart block s/p PPM in 1990 with upgrade to AICD in 2015, mitral valve replacement in 2000, on Coumadin (last taken Monday 9/10/18) , mechanical aortic valve replacement in 2000, in 2011 pt was upgraded to biventricular ICD from a PPM.  At that time pt noted to have a high impedance of his SVC coil and his old ICD lead was abandoned with placement of new lead.  Pt was admitted in April of 2018 to [facility] where he was found to have persistent MSSA bacteremia thought to be possibly 2/2 infected PPM leads and has been on Daptomycin since then and had a PICC line from April to end of July that became infected and was removed in July 2018.  Pt had echocardiogram in April 19 2018 which revealed left ventricular size and function with a moderately dilated LV and moderate to severe global hypokinesis with EF 25%. No vegetations noted. CT chest scan done showed trace pleural effusion and minimal left lung atelectasis and consolidation. PICC line placed with plan 6 weeks antibiotics. (d/c in July 2018 due to infection). Pt had seen Dr. Butcher and was instructed to come to [facility] for planned AICD implantation possible Micra PPM extraction. Code stroke called at 3:01. NIHSS 2 for mild dysarthria and right facial droop. LKW was 2:45pm, pt is not a tpa candidate due to elevated INR. Pt's facial droop was waxing and waning but general improved however persisted. He also has decreased lid closure on the right raising suspicious for bell's palsy.     Impressio  Bell's palsy vs Acute cerebral infarct involving the left hemisphere.    Recommendations:  Would start steroids if no contraindications by Primary team  INR supra therapeutic   Permissive hypertension up to 220/110  Will need to assess if current cardiac device is MRI compatible.  MRI brain non con  MRA head non cont and MRA neck with con "Pt is a a 59 yo   male with pmhx of HFrEF currently on LIFE VEST compliant , Afib, TIAs, Congential heart block s/p PPM in 1990 with upgrade to AICD in 2015, mitral valve replacement in 2000, on Coumadin (last taken Monday 9/10/18) , mechanical aortic valve replacement in 2000, in 2011 pt was upgraded to biventricular ICD from a PPM.  At that time pt noted to have a high impedance of his SVC coil and his old ICD lead was abandoned with placement of new lead.  Pt was admitted in April of 2018 to [facility] where he was found to have persistent MSSA bacteremia thought to be possibly 2/2 infected PPM leads and has been on Daptomycin since then and had a PICC line from April to end of July that became infected and was removed in July 2018.  Pt had echocardiogram in April 19 2018 which revealed left ventricular size and function with a moderately dilated LV and moderate to severe global hypokinesis with EF 25%. No vegetations noted. CT chest scan done showed trace pleural effusion and minimal left lung atelectasis and consolidation. PICC line placed with plan 6 weeks antibiotics. (d/c in July 2018 due to infection). Pt had seen Dr. Butcher and was instructed to come to [facility] for planned AICD implantation possible Micra PPM extraction. Code stroke called at 3:01. NIHSS 2 for mild dysarthria and right facial droop. LKW was 2:45pm, pt is not a tpa candidate due to elevated INR. Pt's facial droop was waxing and waning but general improved however persisted. He also has decreased lid closure on the right raising suspicious for bell's palsy.     Impression: TIA    Recommendations:  INR supra therapeutic   Permissive hypertension up to 220/110  Will need to assess if current cardiac device is MRI compatible.  MRI brain non con  MRA head non cont and MRA neck with con

## 2018-09-13 NOTE — CONSULT NOTE ADULT - SUBJECTIVE AND OBJECTIVE BOX
Neurology Consult Note    HPI  "Pt is a a 59 yo   male with pmhx of HFrEF currently on LIFE VEST compliant , Afib, TIAs, Congential heart block s/p PPM in 1990 with upgrade to AICD in 2015, mitral valve replacement in 2000, on Coumadin (last taken Monday 9/10/18) , mechanical aortic valve replacement in 2000, in 2011 pt was upgraded to biventricular ICD from a PPM. Pt had generator change in 2015. At that time pt noted to have a high impedance of his SVC coil and his old ICD lead was abandoned with placement of new lead.  Pt was admitted in April of 2018 to [facility] where he was found to have persistent MSSA bacteremia thought to be possibly 2/2 infected PPM leads and has been on Daptomycin since then and had a PICC line from April to end of July that became infected and was removed in July 2018.  Pt had echocardiogram in April 19 2018 which revealed left ventricular size and function with a moderately dilated LV and moderate to severe global hypokinesis with EF 25%. No vegetations noted. CT chest scan done showed trace pleural effusion and minimal left lung atelectasis and consolidation. PICC line placed with plan 6 weeks antibiotics. (d/c in July 2018 due to infection). Pt had seen Dr. Butcher and was instructed to come to [facility] for planned AICD implantation possible Micra PPM extraction.  Pt currently on Life Vest."     ROS - negative    Allergies    Percocet 5/325 (Rash)    Intolerances    Social - lives with wife,  but not currently working, nonsmoker        Past Medical History:  Atrial fibrillation    BPH (benign prostatic hyperplasia)    Congenital heart block    Congestive heart failure    Pacemaker  Micra PPM.     Past Surgical History:  Disorder of cardiac pacemaker system  ICD Medtronic 2/2/15  ICD St alejandro 2/2/15   ICD abandoned  Medtronic? implanted 4/13/11  LV lead Medtronic implanted 4/13/11   RV lead abandoned implanted 5/10/90  RA lead Implanted 5/10/90  H/O heart valve replacement with mechanical valve.      Home Medications:  Aspir 81 oral delayed release tablet: 1 tab(s) orally once a day  Home (12 Sep 2018 14:59)  carvedilol 12.5 mg oral tablet: 1 tab(s) orally 2 times a day (12 Sep 2018 14:59)  cefadroxil 500 mg oral capsule: 1 cap(s) orally once a day  Home  (12 Sep 2018 14:59)  Centrum oral tablet: 1 tab(s) orally once a day (12 Sep 2018 14:59)  Coumadin 4 mg oral tablet: 6  orally once a day (at bedtime)  Home (12 Sep 2018 14:59)  digoxin 125 mcg (0.125 mg) oral tablet: 1 tab(s) orally once a day (12 Sep 2018 14:59)  Entresto 49 mg-51 mg oral tablet: 1 tab(s) orally 2 times a day (12 Sep 2018 14:59)  eplerenone 25 mg oral tablet: 1 tab(s) orally once a day (12 Sep 2018 14:59)  magnesium carbonate 250 mg oral capsule: 1 cap(s) orally once a day  home (12 Sep 2018 14:59)  Probiotic Formula oral capsule: 1 cap(s) orally 2 times a day  Home (12 Sep 2018 14:59)  tamsulosin 0.4 mg oral capsule: 1 cap(s) orally once a day (12 Sep 2018 14:59)  Vitamin C 1000 mg oral tablet: 1 tab(s) orally once a day (12 Sep 2018 14:59)  Vitamin D3 1000 intl units oral tablet: 2 tab(s) orally once a day Home  (12 Sep 2018 14:59)    Neurological Exam  MS - AAOx3, fluent speech no word finding difficulties, naming and repetition in tact, very mild dyarhthria  CNs - EOMI, PERRL, Right facial droop, sensation in tact b/l, no field cut  Motor - 5/5 throughout  Sensroy - light touch in tact throughtou   Coordination - FNF in tact b/l, HTS in tact b/l  Gait - stable    Labs                        15.2   9.3   )-----------( 181      ( 13 Sep 2018 03:24 )             44.9   09-13    138  |  100  |  18  ----------------------------<  95  4.2   |  26  |  1.26    Ca    9.2      13 Sep 2018 03:24  PT/INR - ( 13 Sep 2018 03:24 )   PT: 28.0 sec;   INR: 2.52 ratio         PTT - ( 12 Sep 2018 15:07 )  PTT:61.6 sec    < from: CT Brain Stroke Protocol (09.13.18 @ 03:27) >    There is mild volume loss and atherosclerosis. There is no midline shift,   mass effect, extra axial collection, intracranial hemorrhage, or   ventriculomegaly.    The calvarium is intact. The visualized paranasal sinuses are aerated.   The mastoid air cells are clear.    IMPRESSION:    No acute hemorrhage or mass effect.     Discussed the findings with Dr. Andrade at 3:34 AM on 9/13/2018 with read   back.       < end of copied text >

## 2018-09-13 NOTE — CHART NOTE - NSCHARTNOTEFT_GEN_A_CORE
57 yo   male with pmhx of HFrEF currently on LIFE VEST compliant , Afib, TIAs, Congential heart block s/p PPM in 1990 with upgrade to AICD in 2015, mitral valve replacement in 2000, on Coumadin (last taken Monday 9/10/18)  ,mechanical aortic valve replacement in 2000, in 2011 pt was upgraded to biventricular ICD from a PPM. Pt had generator change in 2015. At that time pt noted to have a high impedance of his SVC coil and his old ICD lead was abandoned with placement of new lead .  Pt was admitted in April of 2018 to [facility] where he was found to have persistent MSSA bacteremia thought to be possibly 2/2 infected PPM leads and has been on Daptomycin since then and had a PICC line from April to end of July that became infected and was removed in July 2018.  Pt had echocardiogram in April 19 2018 which revealed left ventricular size and function with a moderately dilated LV and moderate to severe global hypokinesis with EF 25%. No vegetations noted. CT scan done showed trace pleural effusion and minimal left lung atelectasis and consolidation. PICC line placed with plan 6 weeks antibiotics. (d/c in July 2018 due to infection) Pt had seen Dr. Butcher and was instructed to come to [facility] today for planned AICD implantation possible Micra PPM extraction.       Hospital course c/b ? TIA? Belly palsy overnight. Code Stroke called. Neuro evaluation recommends CT Head - neg. MRI pending. Neuro Q 4 hrs. keep bp > 220/110. All antihypertensive medications d/mari.  Cardiology fellow called overnight for INR 2.5 and ? heparin gtt intiation. Writer advised not at this time. Dr Butcher notified ordered immediate intiation of heparin gtt and Urgent brin MRI. Pt stable at this time . Report endorsed to day NP

## 2018-09-13 NOTE — CONSULT NOTE ADULT - ATTENDING COMMENTS
Mr. Dawson58 yo   male with pmhx of HFrEF currently on LIFE VEST compliant , Afib, TIAs, Congential heart block s/p PPM in 1990 with upgrade to AICD in 2015, mitral valve replacement in 2000, on Coumadin (last taken Monday 9/10/18) , mechanical aortic valve replacement in 2000, in 2011.he was evaluated by stroke team for transient episode of dysarthria and possible right facial droop.  Neurological exam today is nonfocal with no evidence of upper or normal upper neuron facial weakness.  Impression:-   #1TIA -  in the setting of subtherapeutic INR.  #2 asymptomatic left anterior cerebral artery stenosis on MRA  I personally reviewed the patient's MRI, DWI and flair sequence; there is no evidence of acute ischemia or chronic infarcts on MRI.  In my opinion, for secondary stroke prevention it is imperative that he has INR levels as recommended by cardiology and  aspirin 81 mg.  For asymptomatic left anterior cerebral artery stenosis on MRA, no additional antithrombotic therapy is necessary.

## 2018-09-13 NOTE — CHART NOTE - NSCHARTNOTEFT_GEN_A_CORE
HPI:  This is a 57 yo   male with pmhx of HFrEF currently on LIFE VEST compliant , Afib, TIAs, Congential heart block s/p PPM in 1990 with upgrade to AICD in 2015, mitral valve replacement in 2000, on Coumadin (last taken Monday 9/10/18)  ,mechanical aortic valve replacement in 2000, in 2011 pt was upgraded to biventricular ICD from a PPM. Pt had generator change in 2015. At that time pt noted to have a high impedance of his SVC coil and his old ICD lead was abandoned with placement of new lead .  Pt was admitted in April of 2018 to [facility] where he was found to have persistent MSSA bacteremia thought to be possibly 2/2 infected PPM leads and has been on Daptomycin since then and had a PICC line from April to end of July that became infected and was removed in July 2018.  Pt had echocardiogram in April 19 2018 which revealed left ventricular size and function with a moderately dilated LV and moderate to severe global hypokinesis with EF 25%. No vegetations noted. CT scan done showed trace pleural effusion and minimal left lung atelectasis and consolidation. PICC line placed with plan 6 weeks antibiotics. (d/c in July 2018 due to infection)   Pt had seen Dr. Butcher and was instructed to come to [facility] today for planned AICD implantation possible Micra PPM extraction  pt currently on Life Vest .  Currently pt denies any CP, SOB, n/v, fevers or chills.    < from: Intra-Operative Transesophageal Echo (06.19.18 @ 19:00) >  Conclusions:  1. Mechanical mitral valve prosthesis. Appears to be seated  well with normal leaftlet motion. No perivalvular or  intravalvular leaks seen.  Washing jets seen. Mean gradient  of 2mmHg.  Likely normal in setting of mechanical mitral  valve  2. Normal trileaflet aortic valve. Minimal aortic  regurgitation.  3. Normal aortic root.  Grade III descending aorta with ring down effect.  4. Severe left atrial enlargement.  Left atrial appendage  thrombus seen. Spontaneous echo contrast seen.  5. Normal right atrium. A device wire is noted in the right  heart.  6. Normal right ventricular size and function.  7. Normal tricuspid valve. Mild tricuspid regurgitation.  Findings dicussed with electrophysiologist.  Confirmed on  6/19/2018 - 19:46:34 by Phil Zhao M.D.  ------------------------------------------------------------------------    < end of copied text > (12 Sep 2018 13:49)  Vital Signs Last 24 Hrs  T(C): 36.7 (13 Sep 2018 13:58), Max: 36.7 (13 Sep 2018 13:58)  T(F): 98.1 (13 Sep 2018 13:58), Max: 98.1 (13 Sep 2018 13:58)  HR: 70 (13 Sep 2018 13:58) (70 - 73)  BP: 105/69 (13 Sep 2018 13:58) (95/69 - 111/68)  BP(mean): --  RR: 17 (13 Sep 2018 13:58) (17 - 18)  SpO2: 97% (13 Sep 2018 13:58) (96% - 98%)                          15.7   9.9   )-----------( 181      ( 13 Sep 2018 13:28 )             46.7   09-13    138  |  100  |  18  ----------------------------<  95  4.2   |  26  |  1.26    Ca    9.2      13 Sep 2018 03:24    Pt. s/o code stroke 0300. CT head negative for acute hemorrhage MRI/MRA negative for embolic or hemorrhagic stroke.  INR 2.05 discussed with Dr. Butcher.  Pt. to receive Coumadin 7mg this evening.  Spoke with neuro may resume Entresto/Carvedilol. Per pt. his BP range is 80s/90s systolic.

## 2018-09-13 NOTE — PROVIDER CONTACT NOTE (CHANGE IN STATUS NOTIFICATION) - BACKGROUND
adm for micra PPM explantation & possible BIV AICD Implatation, pmhx h/o heart valve replacement with mechanical valve, Pacemaker, BPH, Afib adm for micra PPM explantation & possible BIV AICD Implatation, pmhx h/o heart valve replacement with mechanical valve, Pacemaker, BPH, Afib, pt had a life vest on

## 2018-09-14 DIAGNOSIS — I48.91 UNSPECIFIED ATRIAL FIBRILLATION: ICD-10-CM

## 2018-09-14 DIAGNOSIS — Z51.81 ENCOUNTER FOR THERAPEUTIC DRUG LEVEL MONITORING: ICD-10-CM

## 2018-09-14 DIAGNOSIS — N40.0 BENIGN PROSTATIC HYPERPLASIA WITHOUT LOWER URINARY TRACT SYMPTOMS: ICD-10-CM

## 2018-09-14 DIAGNOSIS — I50.9 HEART FAILURE, UNSPECIFIED: ICD-10-CM

## 2018-09-14 DIAGNOSIS — I51.3 INTRACARDIAC THROMBOSIS, NOT ELSEWHERE CLASSIFIED: ICD-10-CM

## 2018-09-14 DIAGNOSIS — Z95.0 PRESENCE OF CARDIAC PACEMAKER: ICD-10-CM

## 2018-09-14 LAB
ANION GAP SERPL CALC-SCNC: 11 MMOL/L — SIGNIFICANT CHANGE UP (ref 5–17)
APTT BLD: 73.6 SEC — HIGH (ref 27.5–37.4)
APTT BLD: 77.4 SEC — HIGH (ref 27.5–37.4)
APTT BLD: 80.9 SEC — HIGH (ref 27.5–37.4)
BUN SERPL-MCNC: 14 MG/DL — SIGNIFICANT CHANGE UP (ref 7–23)
CALCIUM SERPL-MCNC: 8.7 MG/DL — SIGNIFICANT CHANGE UP (ref 8.4–10.5)
CHLORIDE SERPL-SCNC: 104 MMOL/L — SIGNIFICANT CHANGE UP (ref 96–108)
CO2 SERPL-SCNC: 23 MMOL/L — SIGNIFICANT CHANGE UP (ref 22–31)
CREAT SERPL-MCNC: 1.07 MG/DL — SIGNIFICANT CHANGE UP (ref 0.5–1.3)
GLUCOSE SERPL-MCNC: 106 MG/DL — HIGH (ref 70–99)
HCT VFR BLD CALC: 39.7 % — SIGNIFICANT CHANGE UP (ref 39–50)
HGB BLD-MCNC: 14 G/DL — SIGNIFICANT CHANGE UP (ref 13–17)
INR BLD: 2.56 RATIO — HIGH (ref 0.88–1.16)
MCHC RBC-ENTMCNC: 32.1 PG — SIGNIFICANT CHANGE UP (ref 27–34)
MCHC RBC-ENTMCNC: 35.2 GM/DL — SIGNIFICANT CHANGE UP (ref 32–36)
MCV RBC AUTO: 91.2 FL — SIGNIFICANT CHANGE UP (ref 80–100)
PLATELET # BLD AUTO: 160 K/UL — SIGNIFICANT CHANGE UP (ref 150–400)
POTASSIUM SERPL-MCNC: 4.2 MMOL/L — SIGNIFICANT CHANGE UP (ref 3.5–5.3)
POTASSIUM SERPL-SCNC: 4.2 MMOL/L — SIGNIFICANT CHANGE UP (ref 3.5–5.3)
PROTHROM AB SERPL-ACNC: 28.4 SEC — HIGH (ref 9.8–12.7)
RBC # BLD: 4.35 M/UL — SIGNIFICANT CHANGE UP (ref 4.2–5.8)
RBC # FLD: 13 % — SIGNIFICANT CHANGE UP (ref 10.3–14.5)
SODIUM SERPL-SCNC: 138 MMOL/L — SIGNIFICANT CHANGE UP (ref 135–145)
WBC # BLD: 8.3 K/UL — SIGNIFICANT CHANGE UP (ref 3.8–10.5)
WBC # FLD AUTO: 8.3 K/UL — SIGNIFICANT CHANGE UP (ref 3.8–10.5)

## 2018-09-14 PROCEDURE — 99223 1ST HOSP IP/OBS HIGH 75: CPT | Mod: GC

## 2018-09-14 PROCEDURE — 99024 POSTOP FOLLOW-UP VISIT: CPT

## 2018-09-14 RX ORDER — CEPHALEXIN 500 MG
500 CAPSULE ORAL EVERY 12 HOURS
Qty: 0 | Refills: 0 | Status: DISCONTINUED | OUTPATIENT
Start: 2018-09-15 | End: 2018-09-18

## 2018-09-14 RX ORDER — CEFAZOLIN SODIUM 1 G
2000 VIAL (EA) INJECTION ONCE
Qty: 0 | Refills: 0 | Status: COMPLETED | OUTPATIENT
Start: 2018-09-14 | End: 2018-09-14

## 2018-09-14 RX ORDER — WARFARIN SODIUM 2.5 MG/1
5 TABLET ORAL ONCE
Qty: 0 | Refills: 0 | Status: COMPLETED | OUTPATIENT
Start: 2018-09-14 | End: 2018-09-14

## 2018-09-14 RX ORDER — CEPHALEXIN 500 MG
500 CAPSULE ORAL EVERY 12 HOURS
Qty: 0 | Refills: 0 | Status: DISCONTINUED | OUTPATIENT
Start: 2018-09-14 | End: 2018-09-14

## 2018-09-14 RX ADMIN — Medication 100 MILLIGRAM(S): at 08:08

## 2018-09-14 RX ADMIN — SACUBITRIL AND VALSARTAN 1 TABLET(S): 24; 26 TABLET, FILM COATED ORAL at 05:48

## 2018-09-14 RX ADMIN — TAMSULOSIN HYDROCHLORIDE 0.4 MILLIGRAM(S): 0.4 CAPSULE ORAL at 21:20

## 2018-09-14 RX ADMIN — HEPARIN SODIUM 600 UNIT(S)/HR: 5000 INJECTION INTRAVENOUS; SUBCUTANEOUS at 18:30

## 2018-09-14 RX ADMIN — WARFARIN SODIUM 5 MILLIGRAM(S): 2.5 TABLET ORAL at 08:08

## 2018-09-14 RX ADMIN — Medication 81 MILLIGRAM(S): at 11:24

## 2018-09-14 RX ADMIN — CARVEDILOL PHOSPHATE 12.5 MILLIGRAM(S): 80 CAPSULE, EXTENDED RELEASE ORAL at 05:48

## 2018-09-14 RX ADMIN — Medication 0.12 MILLIGRAM(S): at 05:48

## 2018-09-14 RX ADMIN — CARVEDILOL PHOSPHATE 12.5 MILLIGRAM(S): 80 CAPSULE, EXTENDED RELEASE ORAL at 21:20

## 2018-09-14 RX ADMIN — SACUBITRIL AND VALSARTAN 1 TABLET(S): 24; 26 TABLET, FILM COATED ORAL at 18:32

## 2018-09-14 RX ADMIN — HEPARIN SODIUM 650 UNIT(S)/HR: 5000 INJECTION INTRAVENOUS; SUBCUTANEOUS at 11:24

## 2018-09-14 RX ADMIN — HEPARIN SODIUM 700 UNIT(S)/HR: 5000 INJECTION INTRAVENOUS; SUBCUTANEOUS at 04:04

## 2018-09-14 NOTE — PROGRESS NOTE ADULT - SUBJECTIVE AND OBJECTIVE BOX
Patient is a 58y old  male who presents for ICD implant        Allergies    Percocet 5/325 (Rash)    Intolerances        Medications:  ascorbic acid 1000 milliGRAM(s) Oral daily  aspirin enteric coated 81 milliGRAM(s) Oral daily  carvedilol 12.5 milliGRAM(s) Oral every 12 hours  cholecalciferol 2000 Unit(s) Oral daily  digoxin     Tablet 0.125 milliGRAM(s) Oral daily  heparin  Infusion.  Unit(s)/Hr IV Continuous <Continuous>  heparin  Injectable 4100 Unit(s) IV Push every 6 hours PRN  sacubitril 49 mG/valsartan 51 mG 1 Tablet(s) Oral two times a day  tamsulosin 0.4 milliGRAM(s) Oral at bedtime      Vitals:  T(C): 36.6 (09-14-18 @ 11:21), Max: 36.7 (09-13-18 @ 13:58)  HR: 70 (09-14-18 @ 11:21) (70 - 73)  BP: 91/61 (09-14-18 @ 11:21) (90/66 - 105/69)  BP(mean): --  RR: 17 (09-14-18 @ 11:21) (17 - 17)  SpO2: 99% (09-14-18 @ 11:21) (97% - 99%)  Wt(kg): --  Daily     Daily   I&O's Summary    13 Sep 2018 07:01  -  14 Sep 2018 07:00  --------------------------------------------------------  IN: 0 mL / OUT: 0 mL / NET: 0 mL    14 Sep 2018 07:01  -  14 Sep 2018 11:35  --------------------------------------------------------  IN: 0 mL / OUT: 0 mL / NET: 0 mL          Physical Exam:  Appearance: Normal  HENT: Normal oral muscosa, NC/AT  Cardiovascular: S1S2, RRR, No M/R/G, no JVD, No Lower extremity edema  Respiratory: Clear to auscultation bilaterally  Gastrointestinal: Soft, Non tender, Normal Bowel Sounds  Musculoskeletal: No clubbing, No joint deformity   Neurologic: no slurred speech, no facial droop, no extremity weakness, answers congruent with questions asked  Psychiatry: AAOx3, Mood & affect appropriate  Skin: No rashes, No ecchymoses, No cyanosis    09-14    138  |  104  |  14  ----------------------------<  106<H>  4.2   |  23  |  1.07    Ca    8.7      14 Sep 2018 03:16      PT/INR - ( 14 Sep 2018 03:16 )   PT: 24.9 sec;   INR: 2.27 ratio         PTT - ( 14 Sep 2018 09:56 )  PTT:77.4 sec        Lipid panel   Hgb A1c       Interpretation of Telemetry: SR/Paced 60-70

## 2018-09-14 NOTE — CHART NOTE - NSCHARTNOTEFT_GEN_A_CORE
Spoke to Dr. Butcher. Repeat INR 2.56 today.   -ICD implant cancelled and plan to continue heparin gtt until INR>3 as per Dr. Butcher.   -Will admit to medicine for weekend floor coverage with EP to follow.  -Start Keflex 500mg PO BID as Cefadroxil is non-formulary.  -Repeat INR in AM and dose Coumadin accordingly. Spoke to Dr. Butcher. Repeat INR 2.56 today.   -ICD implant/Micra explant cancelled and plan to continue heparin gtt until INR>3 as per Dr. Butcher.   -Will admit to medicine for weekend floor coverage with EP to follow.  -Start Keflex 500mg PO BID as Cefadroxil is non-formulary.  -Repeat INR in AM and dose Coumadin accordingly. Spoke to Dr. Butcher. Repeat INR 2.56 today.   -ICD implant/Micra explant cancelled and plan to continue heparin gtt until INR>3 as per Dr. Butcher.   -Will admit to medicine for weekend floor coverage with EP to follow.  -Start Keflex 500mg PO BID as Cefadroxil is non-formulary.  -Repeat INR in AM and dose Coumadin accordingly.    Addendum: Coumadin 5mg PO tonight ordered as per Dr. Butcher

## 2018-09-14 NOTE — PROGRESS NOTE ADULT - PROBLEM SELECTOR PLAN 3
-to be replaced Monday if INR is within goal range 3-3.5  -keep life vest  -c/w Keflex  -INR today 2. -sinus rhythm, regular  -Continue warfarin, digoxin and heparin drip, magnesium -sinus rhythm, regular  -Continue warfarin, digoxin 125mcg and heparin drip, magnesium carbonate 250 mg -sinus rhythm, regular  -Continue warfarin, digoxin 125mcg and heparin drip, magnesium carbonate 250 mg  - Monitor BMP, replete to K>4, Mg >2

## 2018-09-14 NOTE — PROGRESS NOTE ADULT - PROBLEM SELECTOR PLAN 5
c/w Flomax 0.4mg qhs -intra-op MAURI from June 2018 showed Severe left atrial enlargement with Left atrial appendage thrombus   -continue with anticoagulation -intra-op MAURI from June 2018 showed Severe left atrial enlargement with Left atrial appendage thrombus   -continue with anticoagulation as above

## 2018-09-14 NOTE — PROGRESS NOTE ADULT - ASSESSMENT
57 yo   male with pmhx of HFrEF currently on LIFE VEST compliant , Afib, TIAs, Congential heart block s/p PPM in 1990 with upgrade to AICD in 2015, mitral valve replacement in 2000, on Coumadin (last taken Monday 9/10/18)  ,mechanical aortic valve replacement in 2000, in 2011 pt was upgraded to biventricular ICD from a PPM. Pt had generator change in 2015. At that time pt noted to have a high impedance of his SVC coil and his old ICD lead was abandoned with placement of new lead .  Pt was admitted in April of 2018 to [facility] where he was found to have persistent MSSA bacteremia thought to be possibly 2/2 infected PPM leads and has been on Daptomycin since then and had a PICC line from April to end of July that became infected and was removed in July 2018.  Pt had echocardiogram in April 19 2018 which revealed left ventricular size and function with a moderately dilated LV and moderate to severe global hypokinesis with EF 25%. No vegetations noted. CT scan done showed trace pleural effusion and minimal left lung atelectasis and consolidation. PICC line placed with plan 6 weeks antibiotics. (d/c in July 2018 due to infection)   Pt had seen Dr. Butcher and was instructed to come to [facility] today for planned AICD implantation possible Micra PPM extraction  pt currently on Life Vest .  Currently pt denies any CP, SOB, n/v, fevers or chills.    < from: Intra-Operative Transesophageal Echo (06.19.18 @ 19:00) >  Conclusions:  1. Mechanical mitral valve prosthesis. Appears to be seated  well with normal leaftlet motion. No perivalvular or  intravalvular leaks seen.  Washing jets seen. Mean gradient  of 2mmHg.  Likely normal in setting of mechanical mitral  valve  2. Normal trileaflet aortic valve. Minimal aortic  regurgitation.  3. Normal aortic root.  Grade III descending aorta with ring down effect.  4. Severe left atrial enlargement.  Left atrial appendage  thrombus seen. Spontaneous echo contrast seen.  5. Normal right atrium. A device wire is noted in the right  heart.  6. Normal right ventricular size and function.  7. Normal tricuspid valve. Mild tricuspid regurgitation.  Findings dicussed with electrophysiologist.  Confirmed on  6/19/2018 - 19:46:34 by Phil Kremen, M.D.  ------------------------------------------------------------------------    < end of copied text > (12 Sep 2018 13:49)    -Pt s/p code stroke 9/13 - MRI/MRA with no acute findings.  -Plan for ICD implant today pending repeat INR.   -INR 2.27 this AM. As discussed with Dr. Butcher at bedside pre-procedural antibiotics and Coumadin 5mg PO x 1 STAT given.  -Repeat INR at 1pm.

## 2018-09-14 NOTE — PROGRESS NOTE ADULT - SUBJECTIVE AND OBJECTIVE BOX
HPI:   This is a 57 yo   male with pmhx of HFrEF currently on LIFE VEST compliant , Afib, TIAs, Congential heart block s/p PPM in 1990 with upgrade to AICD in 2015, mitral valve replacement in 2000, on Coumadin (last taken Monday 9/10/18)  ,mechanical aortic valve replacement in 2000, in 2011 pt was upgraded to biventricular ICD from a PPM. Pt had generator change in 2015. At that time pt noted to have a high impedance of his SVC coil and his old ICD lead was abandoned with placement of new lead .  Pt was admitted in April of 2018 to [facility] where he was found to have persistent MSSA bacteremia thought to be possibly 2/2 infected PPM leads and has been on Daptomycin since then and had a PICC line from April to end of July that became infected and was removed in July 2018.  Pt had echocardiogram in April 19 2018 which revealed left ventricular size and function with a moderately dilated LV and moderate to severe global hypokinesis with EF 25%. CT scan done showed trace pleural effusion and minimal left lung atelectasis and consolidation. PICC line placed with plan 6 weeks antibiotics. (d/c in July 2018 due to infection)   Pt had seen Dr. Butcher and was instructed to come to [facility] today for planned AICD implantation possible Micra PPM extraction  pt currently on Life Vest .  Currently pt denies any CP, SOB, n/v, fevers or chills.      PMH:   Atrial fibrillation    BPH (benign prostatic hyperplasia)    Congenital heart block    Congestive heart failure    Pacemaker  Micra PPM.    PSH: R & L inguinal hernia repairs, wisdom tooth extraction  Disorder of cardiac pacemaker system  ICD Medtronic 2/2/15  ICD [employer] 2/2/15   ICD abandoned  Medtronic? implanted 4/13/11  LV lead Medtronic implanted 4/13/11   RV lead abandoned implanted 5/10/90  RA lead Implanted 5/10/90  H/O heart valve replacement with mechanical valve.    Social Hx: Denies tobacco, alcohol or substance use. Retired    Family History: Paternal grandmother: stroke, Mother: lupus, Father: HTN    SUBJECTIVE: Patient feels well. Denies nausea, headache, weakness, chest pain, abdominal pain, vomiting, diarrhea/constipation.     Vital Signs Last 12 Hrs  T(F): 97.9 (09-14-18 @ 16:30), Max: 97.9 (09-14-18 @ 11:21)  HR: 73 (09-14-18 @ 16:30) (70 - 73)  BP: 95/83 (09-14-18 @ 16:30) (91/61 - 100/65)   BP(mean): --  RR: 18 (09-14-18 @ 16:30) (17 - 18)  SpO2: 99% (09-14-18 @ 16:30) (97% - 99%)  I&O's Summary    13 Sep 2018 07:01  -  14 Sep 2018 07:00  --------------------------------------------------------  IN: 0 mL / OUT: 0 mL / NET: 0 mL    14 Sep 2018 07:01  -  14 Sep 2018 17:21  --------------------------------------------------------  IN: 120 mL / OUT: 0 mL / NET: 120 mL        PHYSICAL EXAM:    Constitutional: NAD, AAOx3, comfortable in bed.   HEENT: PEERL, moist mucous membranes, supple neck  Respiratory: Normal rate, rhythm, depth, effort. CTAB. No w/r/r.   Cardiovascular: RRR, normal S1 and S2, no m/r/g.   Gastrointestinal: +BS, soft NTND.   Extremities: wwp, no edema.   Vascular: Pulses equal and strong throughout.   Neurological: Cranial nerves grossly intact, strength and sensation intact throughout.   Skin: No gross skin abnormalities.         LABS:                        14.0   8.3   )-----------( 160      ( 14 Sep 2018 03:16 )             39.7     09-14    138  |  104  |  14  ----------------------------<  106<H>  4.2   |  23  |  1.07    Ca    8.7      14 Sep 2018 03:16      PT/INR - ( 14 Sep 2018 13:27 )   PT: 28.4 sec;   INR: 2.56 ratio         PTT - ( 14 Sep 2018 09:56 )  PTT:77.4 sec      RADIOLOGY & ADDITIONAL TESTS:  < from: Intra-Operative Transesophageal Echo (06.19.18 @ 19:00) >  Conclusions:  1. Mechanical mitral valve prosthesis. Appears to be seated  well with normal leaftlet motion. No perivalvular or  intravalvular leaks seen.  Washing jets seen. Mean gradient  of 2mmHg.  Likely normal in setting of mechanical mitral  valve  2. Normal trileaflet aortic valve. Minimal aortic  regurgitation.  3. Normal aortic root.  Grade III descending aorta with ring down effect.  4. Severe left atrial enlargement.  Left atrial appendage  thrombus seen. Spontaneous echo contrast seen.  5. Normal right atrium. A device wire is noted in the right  heart.  6. Normal right ventricular size and function.  7. Normal tricuspid valve. Mild tricuspid regurgitation.  Findings dicussed with electrophysiologist.  Confirmed on  6/19/2018 - 19:46:34 by Phil Zhao M.D.  ------------------------------------------------------------------------    < end of copied text >        MEDICATIONS  (STANDING):  ascorbic acid 1000 milliGRAM(s) Oral daily  aspirin enteric coated 81 milliGRAM(s) Oral daily  carvedilol 12.5 milliGRAM(s) Oral every 12 hours  cholecalciferol 2000 Unit(s) Oral daily  digoxin     Tablet 0.125 milliGRAM(s) Oral daily  heparin  Infusion.  Unit(s)/Hr (8 mL/Hr) IV Continuous <Continuous>  sacubitril 49 mG/valsartan 51 mG 1 Tablet(s) Oral two times a day  tamsulosin 0.4 milliGRAM(s) Oral at bedtime    MEDICATIONS  (PRN):  heparin  Injectable 4100 Unit(s) IV Push every 6 hours PRN For aPTT less than 40

## 2018-09-14 NOTE — PROGRESS NOTE ADULT - PROBLEM SELECTOR PLAN 1
-sinus rhythm, regular  -Continue warfarin, digoxin and heparin drip, magnesium -INR today 2.56, received 5mg this morning and 7 mg yesterday afternoon  -recheck coags tomorrow morning to adjust dose -on 6mg 7 days a week at home  -INR today 2.56, received 5mg this morning and 7 mg yesterday afternoon  -recheck coags tomorrow morning to adjust dose -on 6mg 7 days a week at home  -INR today 2.56, received 5mg this morning and 7 mg yesterday afternoon  -recheck coags tomorrow morning to adjust dose  - c/w Heparin bridge to coumadin

## 2018-09-14 NOTE — PROGRESS NOTE ADULT - ASSESSMENT
57 yo M with PMH of CHF and a-fib, mechanical Mitral valve, recently hospitalized for MSSA bacteremia, admitted for AICD replacement found to have subtherapeutic INR.

## 2018-09-14 NOTE — PROGRESS NOTE ADULT - PROBLEM SELECTOR PLAN 2
-global hypokinesis, EF 25%  -c/w Eplerenone, carvedilol, valsartan -to be replaced Monday if INR is within goal range 3-3.5  -keep life vest  -c/w Keflex -to be replaced Monday if INR is within goal range 3-3.5  -keep life vest on  -c/w Keflex 500mg

## 2018-09-14 NOTE — PROGRESS NOTE ADULT - PROBLEM SELECTOR PLAN 4
-global hypokinesis, EF 25%  -c/w Eplerenone, carvedilol, valsartan -global hypokinesis, EF 25%  -c/w Eplerenone 25mg QD, carvedilol 12.5mg QD, Entresto 49mg /51 mg BID -global hypokinesis, EF 25%. Euvolemic on examination, not in exacerbation currently  -c/w Eplerenone 25mg QD, carvedilol 12.5mg QD, Entresto 49mg /51 mg BID

## 2018-09-15 LAB
ALBUMIN SERPL ELPH-MCNC: 3.6 G/DL — SIGNIFICANT CHANGE UP (ref 3.3–5)
ALP SERPL-CCNC: 83 U/L — SIGNIFICANT CHANGE UP (ref 40–120)
ALT FLD-CCNC: 12 U/L — SIGNIFICANT CHANGE UP (ref 10–45)
ANION GAP SERPL CALC-SCNC: 12 MMOL/L — SIGNIFICANT CHANGE UP (ref 5–17)
APTT BLD: 64 SEC — HIGH (ref 27.5–37.4)
APTT BLD: 72.6 SEC — HIGH (ref 27.5–37.4)
AST SERPL-CCNC: 20 U/L — SIGNIFICANT CHANGE UP (ref 10–40)
BILIRUB SERPL-MCNC: 0.6 MG/DL — SIGNIFICANT CHANGE UP (ref 0.2–1.2)
BUN SERPL-MCNC: 13 MG/DL — SIGNIFICANT CHANGE UP (ref 7–23)
CALCIUM SERPL-MCNC: 8.7 MG/DL — SIGNIFICANT CHANGE UP (ref 8.4–10.5)
CHLORIDE SERPL-SCNC: 102 MMOL/L — SIGNIFICANT CHANGE UP (ref 96–108)
CO2 SERPL-SCNC: 23 MMOL/L — SIGNIFICANT CHANGE UP (ref 22–31)
CREAT SERPL-MCNC: 1.12 MG/DL — SIGNIFICANT CHANGE UP (ref 0.5–1.3)
GLUCOSE SERPL-MCNC: 87 MG/DL — SIGNIFICANT CHANGE UP (ref 70–99)
HCT VFR BLD CALC: 40.1 % — SIGNIFICANT CHANGE UP (ref 39–50)
HGB BLD-MCNC: 13.5 G/DL — SIGNIFICANT CHANGE UP (ref 13–17)
INR BLD: 3.28 RATIO — HIGH (ref 0.88–1.16)
MAGNESIUM SERPL-MCNC: 1.8 MG/DL — SIGNIFICANT CHANGE UP (ref 1.6–2.6)
MCHC RBC-ENTMCNC: 30.1 PG — SIGNIFICANT CHANGE UP (ref 27–34)
MCHC RBC-ENTMCNC: 33.7 GM/DL — SIGNIFICANT CHANGE UP (ref 32–36)
MCV RBC AUTO: 89.5 FL — SIGNIFICANT CHANGE UP (ref 80–100)
PHOSPHATE SERPL-MCNC: 3.1 MG/DL — SIGNIFICANT CHANGE UP (ref 2.5–4.5)
PLATELET # BLD AUTO: 162 K/UL — SIGNIFICANT CHANGE UP (ref 150–400)
POTASSIUM SERPL-MCNC: 4 MMOL/L — SIGNIFICANT CHANGE UP (ref 3.5–5.3)
POTASSIUM SERPL-SCNC: 4 MMOL/L — SIGNIFICANT CHANGE UP (ref 3.5–5.3)
PROT SERPL-MCNC: 6.4 G/DL — SIGNIFICANT CHANGE UP (ref 6–8.3)
PROTHROM AB SERPL-ACNC: 38 SEC — HIGH (ref 10–13.1)
RBC # BLD: 4.48 M/UL — SIGNIFICANT CHANGE UP (ref 4.2–5.8)
RBC # FLD: 13.5 % — SIGNIFICANT CHANGE UP (ref 10.3–14.5)
SODIUM SERPL-SCNC: 137 MMOL/L — SIGNIFICANT CHANGE UP (ref 135–145)
WBC # BLD: 7.93 K/UL — SIGNIFICANT CHANGE UP (ref 3.8–10.5)
WBC # FLD AUTO: 7.93 K/UL — SIGNIFICANT CHANGE UP (ref 3.8–10.5)

## 2018-09-15 PROCEDURE — 99233 SBSQ HOSP IP/OBS HIGH 50: CPT | Mod: GC

## 2018-09-15 PROCEDURE — 93010 ELECTROCARDIOGRAM REPORT: CPT

## 2018-09-15 RX ORDER — WARFARIN SODIUM 2.5 MG/1
6 TABLET ORAL ONCE
Qty: 0 | Refills: 0 | Status: COMPLETED | OUTPATIENT
Start: 2018-09-15 | End: 2018-09-15

## 2018-09-15 RX ORDER — WARFARIN SODIUM 2.5 MG/1
5 TABLET ORAL ONCE
Qty: 0 | Refills: 0 | Status: DISCONTINUED | OUTPATIENT
Start: 2018-09-15 | End: 2018-09-15

## 2018-09-15 RX ORDER — MAGNESIUM SULFATE 500 MG/ML
1 VIAL (ML) INJECTION ONCE
Qty: 0 | Refills: 0 | Status: COMPLETED | OUTPATIENT
Start: 2018-09-15 | End: 2018-09-15

## 2018-09-15 RX ADMIN — Medication 500 MILLIGRAM(S): at 06:00

## 2018-09-15 RX ADMIN — CARVEDILOL PHOSPHATE 12.5 MILLIGRAM(S): 80 CAPSULE, EXTENDED RELEASE ORAL at 18:18

## 2018-09-15 RX ADMIN — Medication 2000 UNIT(S): at 13:03

## 2018-09-15 RX ADMIN — CARVEDILOL PHOSPHATE 12.5 MILLIGRAM(S): 80 CAPSULE, EXTENDED RELEASE ORAL at 06:00

## 2018-09-15 RX ADMIN — Medication 81 MILLIGRAM(S): at 13:16

## 2018-09-15 RX ADMIN — WARFARIN SODIUM 6 MILLIGRAM(S): 2.5 TABLET ORAL at 18:19

## 2018-09-15 RX ADMIN — HEPARIN SODIUM 600 UNIT(S)/HR: 5000 INJECTION INTRAVENOUS; SUBCUTANEOUS at 09:03

## 2018-09-15 RX ADMIN — Medication 500 MILLIGRAM(S): at 13:02

## 2018-09-15 RX ADMIN — Medication 100 GRAM(S): at 12:57

## 2018-09-15 RX ADMIN — HEPARIN SODIUM 600 UNIT(S)/HR: 5000 INJECTION INTRAVENOUS; SUBCUTANEOUS at 01:30

## 2018-09-15 RX ADMIN — Medication 500 MILLIGRAM(S): at 18:18

## 2018-09-15 RX ADMIN — SACUBITRIL AND VALSARTAN 1 TABLET(S): 24; 26 TABLET, FILM COATED ORAL at 18:18

## 2018-09-15 RX ADMIN — TAMSULOSIN HYDROCHLORIDE 0.4 MILLIGRAM(S): 0.4 CAPSULE ORAL at 21:13

## 2018-09-15 RX ADMIN — SACUBITRIL AND VALSARTAN 1 TABLET(S): 24; 26 TABLET, FILM COATED ORAL at 06:00

## 2018-09-15 RX ADMIN — Medication 0.12 MILLIGRAM(S): at 06:00

## 2018-09-15 NOTE — PROGRESS NOTE ADULT - ASSESSMENT
59 yo M with PMH of CHF and a-fib, mechanical Mitral valve, recently hospitalized for MSSA bacteremia, admitted for AICD replacement found to have subtherapeutic INR. 58M PMHx CHF EF 25%, Afib, TIAs, Congential heart block s/p PPM in 1990, upgrade to biV AICD in 2011, replaced in 2015, mechanical MVR (2000) on Coumadin, c/b persistent MSSA bacteremia presumed 2/2 infected PPM leads (april 2018), s/p PICC c/b infection, maintained on Cefadroxil, life vest compliant, presented for AICD placement, found w/ subtherapeutic INR (goal 3-3.5), hosp course c/b TIA (facial droop), planning AICD placement on Monday 58M PMHx CHF EF 25%, Afib, TIAs, Congential heart block s/p PPM in 1990, upgrade to biV AICD in 2011, replaced in 2015, mechanical MVR (2000) on Coumadin, c/b persistent MSSA bacteremia presumed 2/2 infected PPM leads (april 2018), s/p PICC c/b infection, maintained on Cefadroxil, L atrial appendage thrombus (TTE 6/19), life vest compliant, presented for AICD placement, found w/ subtherapeutic INR (goal 3-3.5), hosp course c/b TIA (facial droop),  planning AICD placement on Monday 58M PMHx CHF EF 25%, Afib, TIAs, Congential heart block s/p PPM in 1990, upgrade to biV AICD in 2011, replaced in 2015, mechanical MVR (2000) on Coumadin, c/b persistent MSSA bacteremia presumed 2/2 infected PPM leads (april 2018), s/p PICC c/b infection, maintained on Cefadroxil, L atrial appendage thrombus (TTE 6/2018), life vest compliant, presented for AICD placement, found w/ subtherapeutic INR (goal 3-3.5), hosp course c/b TIA (facial droop),  planning AICD placement on Monday

## 2018-09-15 NOTE — PROVIDER CONTACT NOTE (MEDICATION) - ASSESSMENT
pt stable, vitals stable, pt does not want to take 5mg coumadin as he already received 5mg coumadin today

## 2018-09-15 NOTE — PROGRESS NOTE ADULT - ASSESSMENT
59 y/o M with pmhx of HFrEF currently on LIFE VEST compliant , Afib, TIAs, Congential heart block s/p PPM in 1990 with upgrade to AICD in 2015, mitral valve replacement in 2000, on Coumadin (last taken Monday 9/10/18)  ,mechanical aortic valve replacement in 2000, in 2011 pt was upgraded to biventricular ICD from a PPM. Pt had generator change in 2015. At that time pt noted to have a high impedance of his SVC coil and his old ICD lead was abandoned with placement of new lead .  Pt was admitted in April of 2018 to [facility] where he was found to have persistent MSSA bacteremia thought to be possibly 2/2 infected PPM leads and has been on Daptomycin since then and had a PICC line from April to end of July that became infected and was removed in July 2018.  Pt had echocardiogram in April 19 2018 which revealed left ventricular size and function with a moderately dilated LV and moderate to severe global hypokinesis with EF 25%. No vegetations noted. CT scan done showed trace pleural effusion and minimal left lung atelectasis and consolidation. PICC line placed with plan 6 weeks antibiotics.  Patient now presents for reimplantation of ICD with Dr Butcher. He is s/p code stroke during this admission on 9/13 with MRI/ MRA head revealing no acute findings.     # HFrEF     - INR today 3.28;  Heparin gtt discontinued, coumadin 5 mg pox1 to be given at 12noon  ( GOAL INR 3-3.5)  - Plan for ICD implantation on Monday; Keep NPO post MN Sunday and ensure an active type and screen for Monday. 57 y/o M with pmhx of HFrEF currently on LIFE VEST compliant , Afib, TIAs, Congential heart block s/p PPM in 1990 with upgrade to AICD in 2015, mitral valve replacement in 2000, on Coumadin (last taken Monday 9/10/18)  ,mechanical aortic valve replacement in 2000, in 2011 pt was upgraded to biventricular ICD from a PPM. Pt had generator change in 2015. At that time pt noted to have a high impedance of his SVC coil and his old ICD lead was abandoned with placement of new lead .  Pt was admitted in April of 2018 to [facility] where he was found to have persistent MSSA bacteremia thought to be possibly 2/2 infected PPM leads and has been on Daptomycin since then and had a PICC line from April to end of July that became infected and was removed in July 2018.  Pt had echocardiogram in April 19 2018 which revealed left ventricular size and function with a moderately dilated LV and moderate to severe global hypokinesis with EF 25%. No vegetations noted. CT scan done showed trace pleural effusion and minimal left lung atelectasis and consolidation. PICC line placed with plan 6 weeks antibiotics.  Patient now presents for reimplantation of ICD with Dr Butcher. He is s/p code stroke during this admission on 9/13 with MRI/ MRA head revealing no acute findings.     # HFrEF ( 20-25%)  # history of mechanical valve    - INR today 3.28;  Heparin gtt discontinued, coumadin 5 mg pox1 to be given at 12noon. Monitor INR ( GOAL INR 3-3.5)  - Plan for ICD implantation on Monday; Keep NPO post MN Sunday and ensure an active type and screen for Monday.   - Life vest in place  - Continue current medical therapy for HF  - Continue to monitor on telemetry 59 y/o M with pmhx of HFrEF currently on LIFE VEST compliant , Afib, TIAs, Congential heart block s/p PPM in 1990 with upgrade to AICD in 2015, mitral valve replacement in 2000, on Coumadin (last taken Monday 9/10/18)  ,mechanical aortic valve replacement in 2000, in 2011 pt was upgraded to biventricular ICD from a PPM. Pt had generator change in 2015. At that time pt noted to have a high impedance of his SVC coil and his old ICD lead was abandoned with placement of new lead .  Pt was admitted in April of 2018 to [facility] where he was found to have persistent MSSA bacteremia thought to be possibly 2/2 infected leads with ICD removed and Micra placed 6/19/18 as pt is PPM dependent. Patient has been on Daptomycin since then and had a PICC line from April to end of July.  Patient now presents for reimplantation of ICD with Dr Butcher. He is s/p code stroke during this admission on 9/13 with MRI/ MRA head revealing no acute findings.     # HFrEF ( 20-25%)  # history of mechanical valve    - INR today 3.28;  Heparin gtt discontinued, coumadin 5 mg pox1 to be given at 12noon. Monitor INR ( GOAL INR 3-3.5)  - Plan for ICD implantation on Monday; Keep NPO post MN Sunday and ensure an active type and screen for Monday.   - Life vest in place  - Continue current medical therapy for HF  - Continue to monitor on telemetry.  [phone] 59 y/o M with pmhx of HFrEF currently on LIFE VEST compliant , Afib, TIAs, Congential heart block s/p PPM in 1990 with upgrade to AICD in 2015, mitral valve replacement in 2000, on Coumadin (last taken Monday 9/10/18)  ,mechanical aortic valve replacement in 2000, in 2011 pt was upgraded to biventricular ICD from a PPM. Pt had generator change in 2015. At that time pt noted to have a high impedance of his SVC coil and his old ICD lead was abandoned with placement of new lead .  Pt was admitted in April of 2018 to [facility] where he was found to have persistent MSSA bacteremia thought to be possibly 2/2 infected leads with ICD removed and Micra placed 6/19/18 as pt is PPM dependent. Patient has been on Daptomycin since then and had a PICC line from April to end of July.  Patient now presents for reimplantation of ICD with Dr Butcher. He is s/p code stroke during this admission on 9/13 with MRI/ MRA head revealing no acute findings.     # HFrEF ( 20-25%)  # history of mechanical valve    - INR today 3.28;  Heparin gtt discontinued, coumadin 6mg pox1 to be given at  6pm. Monitor INR ( GOAL INR 3-3.5)  - Plan for ICD implantation on Monday; Keep NPO post MN Sunday and ensure an active type and screen for Monday.   - Life vest in place  - Continue current medical therapy for HF  - Continue to monitor on telemetry.  The above discussed with Team 1 Dr Barragan [id number]   [phone]

## 2018-09-15 NOTE — PROGRESS NOTE ADULT - PROBLEM SELECTOR PLAN 3
-sinus rhythm, regular  -Continue warfarin, digoxin 125mcg and heparin drip, magnesium carbonate 250 mg  - Monitor BMP, replete to K>4, Mg >2

## 2018-09-15 NOTE — PROGRESS NOTE ADULT - PROBLEM SELECTOR PLAN 1
-on 6mg 7 days a week at home  -INR today 2.56, received 5mg this morning and 7 mg yesterday afternoon  -recheck coags tomorrow morning to adjust dose  - c/w Heparin bridge to coumadin -on 6mg 7 days a week at home  -INR today 3.2  -per EP 5 mg coumadin today.  - c/w Heparin bridge to coumadin -on 6mg 7 days a week at home  -INR today 3.2  -per EP 6 mg coumadin today.    d/c Heparin.

## 2018-09-15 NOTE — PROGRESS NOTE ADULT - SUBJECTIVE AND OBJECTIVE BOX
OVERNIGHT EVENTS:    SUBJECTIVE:    Vital Signs Last 12 Hrs  T(F): 97.5 (09-15-18 @ 04:09), Max: 97.5 (09-15-18 @ 04:09)  HR: 70 (09-15-18 @ 05:41) (70 - 73)  BP: 101/63 (09-15-18 @ 05:41) (90/61 - 102/60)  BP(mean): --  RR: 18 (09-15-18 @ 04:09) (18 - 18)  SpO2: 97% (09-15-18 @ 04:09) (95% - 97%)  I&O's Summary    13 Sep 2018 07:01  -  14 Sep 2018 07:00  --------------------------------------------------------  IN: 0 mL / OUT: 0 mL / NET: 0 mL    14 Sep 2018 07:01  -  15 Sep 2018 06:40  --------------------------------------------------------  IN: 120 mL / OUT: 0 mL / NET: 120 mL        PHYSICAL EXAM:    Constitutional: NAD, AAOx3, comfortable in bed.   Respiratory: Normal rate, rhythm, depth, effort. CTAB. No w/r/r.   Cardiovascular: RRR, normal S1 and S2, no m/r/g.   Gastrointestinal: +BS, soft NTND.   Extremities: wwp, no edema.   Vascular: Pulses equal and strong throughout.   Neurological: Cranial nerves grossly intact, strength and sensation intact throughout.   Skin: No gross skin abnormalities.         LABS:                        14.0   8.3   )-----------( 160      ( 14 Sep 2018 03:16 )             39.7     09-14    138  |  104  |  14  ----------------------------<  106<H>  4.2   |  23  |  1.07    Ca    8.7      14 Sep 2018 03:16      PT/INR - ( 14 Sep 2018 13:27 )   PT: 28.4 sec;   INR: 2.56 ratio         PTT - ( 15 Sep 2018 00:55 )  PTT:64.0 sec      RADIOLOGY & ADDITIONAL TESTS:    MEDICATIONS  (STANDING):  ascorbic acid 1000 milliGRAM(s) Oral daily  aspirin enteric coated 81 milliGRAM(s) Oral daily  carvedilol 12.5 milliGRAM(s) Oral every 12 hours  cephalexin 500 milliGRAM(s) Oral every 12 hours  cholecalciferol 2000 Unit(s) Oral daily  digoxin     Tablet 0.125 milliGRAM(s) Oral daily  heparin  Infusion.  Unit(s)/Hr (8 mL/Hr) IV Continuous <Continuous>  sacubitril 49 mG/valsartan 51 mG 1 Tablet(s) Oral two times a day  tamsulosin 0.4 milliGRAM(s) Oral at bedtime    MEDICATIONS  (PRN):  heparin  Injectable 4100 Unit(s) IV Push every 6 hours PRN For aPTT less than 40

## 2018-09-15 NOTE — PROGRESS NOTE ADULT - PROBLEM SELECTOR PLAN 5
-intra-op MAURI from June 2018 showed Severe left atrial enlargement with Left atrial appendage thrombus   -continue with anticoagulation as above

## 2018-09-15 NOTE — PROGRESS NOTE ADULT - PROBLEM SELECTOR PLAN 4
-global hypokinesis, EF 25%. Euvolemic on examination, not in exacerbation currently  -c/w Eplerenone 25mg QD, carvedilol 12.5mg QD, Entresto 49mg /51 mg BID

## 2018-09-15 NOTE — PROGRESS NOTE ADULT - SUBJECTIVE AND OBJECTIVE BOX
INTERVAL HPI/OVERNIGHT EVENTS: No significant overnight cardiac events reported. Patient denies chest pain/sob/dizziness/ palpitations.     MEDICATIONS  (STANDING):  ascorbic acid 1000 milliGRAM(s) Oral daily  aspirin enteric coated 81 milliGRAM(s) Oral daily  carvedilol 12.5 milliGRAM(s) Oral every 12 hours  cephalexin 500 milliGRAM(s) Oral every 12 hours  cholecalciferol 2000 Unit(s) Oral daily  digoxin     Tablet 0.125 milliGRAM(s) Oral daily  heparin  Infusion.  Unit(s)/Hr (8 mL/Hr) IV Continuous <Continuous>  magnesium sulfate  IVPB 1 Gram(s) IV Intermittent once  sacubitril 49 mG/valsartan 51 mG 1 Tablet(s) Oral two times a day  tamsulosin 0.4 milliGRAM(s) Oral at bedtime  warfarin 5 milliGRAM(s) Oral once    MEDICATIONS  (PRN):      Allergies  Percocet 5/325 (Rash)    ROS:  General: Pt denies fevers/chills  Cardiovascular: denies chest pain/palpitations  Respiratory and Thorax: denies SOB  Gastrointestinal: denies abdominal pain/diarrhea/bloody stool  Genitourinary: denies dysuria/ hematuria  Musculoskeletal: , denies restricted motion  Hematologic: denies abnormal bleeding      Vital Signs Last 24 Hrs  T(C): 36.4 (15 Sep 2018 04:09), Max: 36.6 (14 Sep 2018 11:21)  T(F): 97.5 (15 Sep 2018 04:09), Max: 97.9 (14 Sep 2018 11:21)  HR: 70 (15 Sep 2018 05:41) (70 - 73)  BP: 101/63 (15 Sep 2018 05:41) (90/61 - 102/60)  BP(mean): --  RR: 18 (15 Sep 2018 04:09) (17 - 18)  SpO2: 97% (15 Sep 2018 04:09) (95% - 99%)    Physical Exam:  Constitutional: well developed, well nourished and no acute distress. Life vest in place.   Neurological: Alert & Oriented x 3,  no focal deficits  Respiratory:  Breathing nonlabored; CTA bilaterally.   Cardiovascular: (+) S1 & S2.   Gastrointestinal: soft, NT, nondistended, (+) BS  Extremities: +2 bilateral radial pulses.  No pedal edema.   Skin:  normal skin color and pigmentation, no skin lesions noted.     LABS:                        13.5   7.93  )-----------( 162      ( 15 Sep 2018 08:27 )             40.1     09-15    137  |  102  |  13  ----------------------------<  87  4.0   |  23  |  1.12    Ca    8.7      15 Sep 2018 06:57  Phos  3.1     09-15  Mg     1.8     09-15    TPro  6.4  /  Alb  3.6  /  TBili  0.6  /  DBili  x   /  AST  20  /  ALT  12  /  AlkPhos  83  09-15    PT/INR - ( 15 Sep 2018 08:27 )   PT: 38.0 sec;   INR: 3.28 ratio         PTT - ( 15 Sep 2018 08:14 )  PTT:72.6 sec      RADIOLOGY & ADDITIONAL TESTS: < from: Intra-Operative Transesophageal Echo (06.19.18 @ 19:00) >  EF (Visual Estimate): 20-25% %        TELE: Afib Vpaced 60's - 70's INTERVAL HPI/OVERNIGHT EVENTS: No significant overnight cardiac events reported. Patient denies chest pain/sob/dizziness/ palpitations.     MEDICATIONS  (STANDING):  ascorbic acid 1000 milliGRAM(s) Oral daily  aspirin enteric coated 81 milliGRAM(s) Oral daily  carvedilol 12.5 milliGRAM(s) Oral every 12 hours  cephalexin 500 milliGRAM(s) Oral every 12 hours  cholecalciferol 2000 Unit(s) Oral daily  digoxin     Tablet 0.125 milliGRAM(s) Oral daily  heparin  Infusion.  Unit(s)/Hr (8 mL/Hr) IV Continuous <Continuous>  magnesium sulfate  IVPB 1 Gram(s) IV Intermittent once  sacubitril 49 mG/valsartan 51 mG 1 Tablet(s) Oral two times a day  tamsulosin 0.4 milliGRAM(s) Oral at bedtime  warfarin 5 milliGRAM(s) Oral once    MEDICATIONS  (PRN):      Allergies  Percocet 5/325 (Rash)    ROS:  General: Pt denies fevers/chills  Cardiovascular: denies chest pain/palpitations  Respiratory and Thorax: denies SOB  Gastrointestinal: denies abdominal pain/diarrhea/bloody stool  Genitourinary: denies dysuria/ hematuria  Musculoskeletal: , denies restricted motion  Hematologic: denies abnormal bleeding      Vital Signs Last 24 Hrs  T(C): 36.4 (15 Sep 2018 04:09), Max: 36.6 (14 Sep 2018 11:21)  T(F): 97.5 (15 Sep 2018 04:09), Max: 97.9 (14 Sep 2018 11:21)  HR: 70 (15 Sep 2018 05:41) (70 - 73)  BP: 101/63 (15 Sep 2018 05:41) (90/61 - 102/60)  BP(mean): --  RR: 18 (15 Sep 2018 04:09) (17 - 18)  SpO2: 97% (15 Sep 2018 04:09) (95% - 99%)    Physical Exam:  Constitutional: well developed, well nourished and no acute distress. Life vest in place.   Neurological: Alert & Oriented x 3,  no focal deficits  Respiratory:  Breathing nonlabored; CTA bilaterally.   Cardiovascular: (+) S1 & S2.   Gastrointestinal: soft, NT, nondistended, (+) BS  Extremities: +2 bilateral radial pulses.  No pedal edema.   Skin:  normal skin color and pigmentation, no skin lesions noted.     LABS:                        13.5   7.93  )-----------( 162      ( 15 Sep 2018 08:27 )             40.1     09-15    137  |  102  |  13  ----------------------------<  87  4.0   |  23  |  1.12    Ca    8.7      15 Sep 2018 06:57  Phos  3.1     09-15  Mg     1.8     09-15    TPro  6.4  /  Alb  3.6  /  TBili  0.6  /  DBili  x   /  AST  20  /  ALT  12  /  AlkPhos  83  09-15    PT/INR - ( 15 Sep 2018 08:27 )   PT: 38.0 sec;   INR: 3.28 ratio         PTT - ( 15 Sep 2018 08:14 )  PTT:72.6 sec      RADIOLOGY & ADDITIONAL TESTS: < from: Intra-Operative Transesophageal Echo (06.19.18 @ 19:00) >  EF (Visual Estimate): 20-25% %    < from: MR Head No Cont (09.13.18 @ 12:25) >  IMPRESSION:    1. Brain MRI: There is no evidence for acute infarct or acute hemorrhage.  2. Brain MRA: Moderate stenosis involves the mid and distal aspects of   the left anterior cerebral artery.  3. Neck MRA: No evidence for carotid or vertebral artery stenosis.            TELE: Afib Vpaced 60's - 70's

## 2018-09-16 LAB
ALBUMIN SERPL ELPH-MCNC: 4.1 G/DL — SIGNIFICANT CHANGE UP (ref 3.3–5)
ALP SERPL-CCNC: 95 U/L — SIGNIFICANT CHANGE UP (ref 40–120)
ALT FLD-CCNC: 13 U/L — SIGNIFICANT CHANGE UP (ref 10–45)
ANION GAP SERPL CALC-SCNC: 12 MMOL/L — SIGNIFICANT CHANGE UP (ref 5–17)
APTT BLD: 48.7 SEC — HIGH (ref 27.5–37.4)
APTT BLD: > 200 SEC (ref 27.5–37.4)
AST SERPL-CCNC: 20 U/L — SIGNIFICANT CHANGE UP (ref 10–40)
BASOPHILS # BLD AUTO: 0.04 K/UL — SIGNIFICANT CHANGE UP (ref 0–0.2)
BASOPHILS NFR BLD AUTO: 0.5 % — SIGNIFICANT CHANGE UP (ref 0–2)
BILIRUB SERPL-MCNC: 0.6 MG/DL — SIGNIFICANT CHANGE UP (ref 0.2–1.2)
BLD GP AB SCN SERPL QL: NEGATIVE — SIGNIFICANT CHANGE UP
BUN SERPL-MCNC: 13 MG/DL — SIGNIFICANT CHANGE UP (ref 7–23)
CALCIUM SERPL-MCNC: 9.3 MG/DL — SIGNIFICANT CHANGE UP (ref 8.4–10.5)
CHLORIDE SERPL-SCNC: 104 MMOL/L — SIGNIFICANT CHANGE UP (ref 96–108)
CO2 SERPL-SCNC: 23 MMOL/L — SIGNIFICANT CHANGE UP (ref 22–31)
CREAT SERPL-MCNC: 1.22 MG/DL — SIGNIFICANT CHANGE UP (ref 0.5–1.3)
EOSINOPHIL # BLD AUTO: 0.32 K/UL — SIGNIFICANT CHANGE UP (ref 0–0.5)
EOSINOPHIL NFR BLD AUTO: 4.2 % — SIGNIFICANT CHANGE UP (ref 0–6)
GLUCOSE SERPL-MCNC: 86 MG/DL — SIGNIFICANT CHANGE UP (ref 70–99)
HCT VFR BLD CALC: 44.7 % — SIGNIFICANT CHANGE UP (ref 39–50)
HCT VFR BLD CALC: 45.2 % — SIGNIFICANT CHANGE UP (ref 39–50)
HGB BLD-MCNC: 15.2 G/DL — SIGNIFICANT CHANGE UP (ref 13–17)
HGB BLD-MCNC: 15.9 G/DL — SIGNIFICANT CHANGE UP (ref 13–17)
IMM GRANULOCYTES NFR BLD AUTO: 0.3 % — SIGNIFICANT CHANGE UP (ref 0–1.5)
INR BLD: 2.42 RATIO — HIGH (ref 0.88–1.16)
INR BLD: 3.31 RATIO — HIGH (ref 0.88–1.16)
LYMPHOCYTES # BLD AUTO: 1.7 K/UL — SIGNIFICANT CHANGE UP (ref 1–3.3)
LYMPHOCYTES # BLD AUTO: 22.6 % — SIGNIFICANT CHANGE UP (ref 13–44)
MAGNESIUM SERPL-MCNC: 2 MG/DL — SIGNIFICANT CHANGE UP (ref 1.6–2.6)
MCHC RBC-ENTMCNC: 31 PG — SIGNIFICANT CHANGE UP (ref 27–34)
MCHC RBC-ENTMCNC: 31.5 PG — SIGNIFICANT CHANGE UP (ref 27–34)
MCHC RBC-ENTMCNC: 34 GM/DL — SIGNIFICANT CHANGE UP (ref 32–36)
MCHC RBC-ENTMCNC: 35.2 GM/DL — SIGNIFICANT CHANGE UP (ref 32–36)
MCV RBC AUTO: 89.5 FL — SIGNIFICANT CHANGE UP (ref 80–100)
MCV RBC AUTO: 91.1 FL — SIGNIFICANT CHANGE UP (ref 80–100)
MONOCYTES # BLD AUTO: 0.87 K/UL — SIGNIFICANT CHANGE UP (ref 0–0.9)
MONOCYTES NFR BLD AUTO: 11.6 % — SIGNIFICANT CHANGE UP (ref 2–14)
NEUTROPHILS # BLD AUTO: 4.58 K/UL — SIGNIFICANT CHANGE UP (ref 1.8–7.4)
NEUTROPHILS NFR BLD AUTO: 60.8 % — SIGNIFICANT CHANGE UP (ref 43–77)
PHOSPHATE SERPL-MCNC: 2.7 MG/DL — SIGNIFICANT CHANGE UP (ref 2.5–4.5)
PLATELET # BLD AUTO: 182 K/UL — SIGNIFICANT CHANGE UP (ref 150–400)
PLATELET # BLD AUTO: 183 K/UL — SIGNIFICANT CHANGE UP (ref 150–400)
POTASSIUM SERPL-MCNC: 4 MMOL/L — SIGNIFICANT CHANGE UP (ref 3.5–5.3)
POTASSIUM SERPL-SCNC: 4 MMOL/L — SIGNIFICANT CHANGE UP (ref 3.5–5.3)
PROT SERPL-MCNC: 7.1 G/DL — SIGNIFICANT CHANGE UP (ref 6–8.3)
PROTHROM AB SERPL-ACNC: 27.9 SEC — HIGH (ref 10–13.1)
PROTHROM AB SERPL-ACNC: 36.6 SEC — HIGH (ref 9.8–12.7)
RBC # BLD: 4.91 M/UL — SIGNIFICANT CHANGE UP (ref 4.2–5.8)
RBC # BLD: 5.05 M/UL — SIGNIFICANT CHANGE UP (ref 4.2–5.8)
RBC # FLD: 12.7 % — SIGNIFICANT CHANGE UP (ref 10.3–14.5)
RBC # FLD: 13.8 % — SIGNIFICANT CHANGE UP (ref 10.3–14.5)
RH IG SCN BLD-IMP: POSITIVE — SIGNIFICANT CHANGE UP
SODIUM SERPL-SCNC: 139 MMOL/L — SIGNIFICANT CHANGE UP (ref 135–145)
WBC # BLD: 7.53 K/UL — SIGNIFICANT CHANGE UP (ref 3.8–10.5)
WBC # BLD: 7.6 K/UL — SIGNIFICANT CHANGE UP (ref 3.8–10.5)
WBC # FLD AUTO: 7.53 K/UL — SIGNIFICANT CHANGE UP (ref 3.8–10.5)
WBC # FLD AUTO: 7.6 K/UL — SIGNIFICANT CHANGE UP (ref 3.8–10.5)

## 2018-09-16 PROCEDURE — 99233 SBSQ HOSP IP/OBS HIGH 50: CPT | Mod: GC

## 2018-09-16 RX ORDER — HEPARIN SODIUM 5000 [USP'U]/ML
INJECTION INTRAVENOUS; SUBCUTANEOUS
Qty: 25000 | Refills: 0 | Status: DISCONTINUED | OUTPATIENT
Start: 2018-09-16 | End: 2018-09-16

## 2018-09-16 RX ORDER — HEPARIN SODIUM 5000 [USP'U]/ML
5500 INJECTION INTRAVENOUS; SUBCUTANEOUS EVERY 6 HOURS
Qty: 0 | Refills: 0 | Status: DISCONTINUED | OUTPATIENT
Start: 2018-09-16 | End: 2018-09-16

## 2018-09-16 RX ORDER — WARFARIN SODIUM 2.5 MG/1
2 TABLET ORAL ONCE
Qty: 0 | Refills: 0 | Status: COMPLETED | OUTPATIENT
Start: 2018-09-16 | End: 2018-09-16

## 2018-09-16 RX ORDER — WARFARIN SODIUM 2.5 MG/1
6 TABLET ORAL ONCE
Qty: 0 | Refills: 0 | Status: COMPLETED | OUTPATIENT
Start: 2018-09-16 | End: 2018-09-16

## 2018-09-16 RX ORDER — HEPARIN SODIUM 5000 [USP'U]/ML
2500 INJECTION INTRAVENOUS; SUBCUTANEOUS EVERY 6 HOURS
Qty: 0 | Refills: 0 | Status: DISCONTINUED | OUTPATIENT
Start: 2018-09-16 | End: 2018-09-16

## 2018-09-16 RX ADMIN — CARVEDILOL PHOSPHATE 12.5 MILLIGRAM(S): 80 CAPSULE, EXTENDED RELEASE ORAL at 05:09

## 2018-09-16 RX ADMIN — TAMSULOSIN HYDROCHLORIDE 0.4 MILLIGRAM(S): 0.4 CAPSULE ORAL at 21:53

## 2018-09-16 RX ADMIN — WARFARIN SODIUM 6 MILLIGRAM(S): 2.5 TABLET ORAL at 10:05

## 2018-09-16 RX ADMIN — WARFARIN SODIUM 2 MILLIGRAM(S): 2.5 TABLET ORAL at 21:53

## 2018-09-16 RX ADMIN — Medication 500 MILLIGRAM(S): at 05:09

## 2018-09-16 RX ADMIN — HEPARIN SODIUM 0 UNIT(S)/HR: 5000 INJECTION INTRAVENOUS; SUBCUTANEOUS at 17:36

## 2018-09-16 RX ADMIN — HEPARIN SODIUM 1200 UNIT(S)/HR: 5000 INJECTION INTRAVENOUS; SUBCUTANEOUS at 10:05

## 2018-09-16 RX ADMIN — Medication 81 MILLIGRAM(S): at 13:10

## 2018-09-16 RX ADMIN — Medication 500 MILLIGRAM(S): at 17:37

## 2018-09-16 RX ADMIN — SACUBITRIL AND VALSARTAN 1 TABLET(S): 24; 26 TABLET, FILM COATED ORAL at 17:37

## 2018-09-16 RX ADMIN — CARVEDILOL PHOSPHATE 12.5 MILLIGRAM(S): 80 CAPSULE, EXTENDED RELEASE ORAL at 17:37

## 2018-09-16 RX ADMIN — HEPARIN SODIUM 1000 UNIT(S)/HR: 5000 INJECTION INTRAVENOUS; SUBCUTANEOUS at 18:37

## 2018-09-16 RX ADMIN — Medication 2000 UNIT(S): at 13:11

## 2018-09-16 RX ADMIN — Medication 0.12 MILLIGRAM(S): at 05:09

## 2018-09-16 RX ADMIN — SACUBITRIL AND VALSARTAN 1 TABLET(S): 24; 26 TABLET, FILM COATED ORAL at 05:09

## 2018-09-16 RX ADMIN — Medication 1000 MILLIGRAM(S): at 13:11

## 2018-09-16 NOTE — PROGRESS NOTE ADULT - ASSESSMENT
58M PMHx CHF EF 25%, Afib, TIAs, Congential heart block s/p PPM in 1990, upgrade to biV AICD in 2011, replaced in 2015, mechanical MVR (2000) on Coumadin, c/b persistent MSSA bacteremia presumed 2/2 infected PPM leads (april 2018), s/p PICC c/b infection, maintained on Cefadroxil, L atrial appendage thrombus (TTE 6/2018), life vest compliant, presented for AICD placement, found w/ subtherapeutic INR (goal 3-3.5), hosp course c/b TIA (facial droop),  planning AICD placement on Monday

## 2018-09-16 NOTE — PROGRESS NOTE ADULT - SUBJECTIVE AND OBJECTIVE BOX
24H hour events: No acute events.    MEDICATIONS:  aspirin enteric coated 81 milliGRAM(s) Oral daily  carvedilol 12.5 milliGRAM(s) Oral every 12 hours  digoxin     Tablet 0.125 milliGRAM(s) Oral daily  heparin  Infusion.  Unit(s)/Hr IV Continuous <Continuous>  heparin  Injectable 5500 Unit(s) IV Push every 6 hours PRN  heparin  Injectable 2500 Unit(s) IV Push every 6 hours PRN  sacubitril 49 mG/valsartan 51 mG 1 Tablet(s) Oral two times a day  tamsulosin 0.4 milliGRAM(s) Oral at bedtime  warfarin 2 milliGRAM(s) Oral once  cephalexin 500 milliGRAM(s) Oral every 12 hours  ascorbic acid 1000 milliGRAM(s) Oral daily  cholecalciferol 2000 Unit(s) Oral daily    REVIEW OF SYSTEMS:  Complete 10point ROS negative.    PHYSICAL EXAM:  T(C): 36.6 (09-16-18 @ 04:20), Max: 36.7 (09-15-18 @ 12:23)  HR: 70 (09-16-18 @ 04:20) (69 - 73)  BP: 96/62 (09-16-18 @ 04:20) (90/60 - 103/69)  RR: 18 (09-16-18 @ 04:20) (18 - 18)  SpO2: 97% (09-16-18 @ 04:20) (96% - 97%)  Wt(kg): --  I&O's Summary    15 Sep 2018 07:01  -  16 Sep 2018 07:00  --------------------------------------------------------  IN: 1300 mL / OUT: 0 mL / NET: 1300 mL      Appearance: NAD	  HEENT:   Normal oral mucosa, PERRL, EOMI	  Cardiovascular: Normal S1 S2, No JVD, No murmurs, No edema  Respiratory: Lungs clear to auscultation	  Psychiatry: A & O x 3, Mood & affect appropriate  Gastrointestinal:  Soft, Non-tender, + BS	  Neurologic: Non-focal      LABS:	 	    CBC Full  -  ( 16 Sep 2018 07:57 )  WBC Count : 7.53 K/uL  Hemoglobin : 15.9 g/dL  Hematocrit : 45.2 %  Platelet Count - Automated : 182 K/uL  Mean Cell Volume : 89.5 fl  Mean Cell Hemoglobin : 31.5 pg  Mean Cell Hemoglobin Concentration : 35.2 gm/dL  Auto Neutrophil # : 4.58 K/uL  Auto Lymphocyte # : 1.70 K/uL  Auto Monocyte # : 0.87 K/uL  Auto Eosinophil # : 0.32 K/uL  Auto Basophil # : 0.04 K/uL  Auto Neutrophil % : 60.8 %  Auto Lymphocyte % : 22.6 %  Auto Monocyte % : 11.6 %  Auto Eosinophil % : 4.2 %  Auto Basophil % : 0.5 %    09-16    139  |  104  |  13  ----------------------------<  86  4.0   |  23  |  1.22  09-15    137  |  102  |  13  ----------------------------<  87  4.0   |  23  |  1.12    Ca    9.3      16 Sep 2018 05:31  Ca    8.7      15 Sep 2018 06:57  Phos  2.7     09-16  Phos  3.1     09-15  Mg     2.0     09-16  Mg     1.8     09-15    TELEMETRY: Af- 50-70s

## 2018-09-16 NOTE — PROGRESS NOTE ADULT - SUBJECTIVE AND OBJECTIVE BOX
Patient is a 58y old  Male who presents with a chief complaint of subtherapeutic INR, AICD replacement (15 Sep 2018 06:39)        SUBJECTIVE / OVERNIGHT EVENTS:      MEDICATIONS  (STANDING):  ascorbic acid 1000 milliGRAM(s) Oral daily  aspirin enteric coated 81 milliGRAM(s) Oral daily  carvedilol 12.5 milliGRAM(s) Oral every 12 hours  cephalexin 500 milliGRAM(s) Oral every 12 hours  cholecalciferol 2000 Unit(s) Oral daily  digoxin     Tablet 0.125 milliGRAM(s) Oral daily  heparin  Infusion.  Unit(s)/Hr (12 mL/Hr) IV Continuous <Continuous>  sacubitril 49 mG/valsartan 51 mG 1 Tablet(s) Oral two times a day  tamsulosin 0.4 milliGRAM(s) Oral at bedtime  warfarin 2 milliGRAM(s) Oral once    MEDICATIONS  (PRN):  heparin  Injectable 5500 Unit(s) IV Push every 6 hours PRN For aPTT less than 40  heparin  Injectable 2500 Unit(s) IV Push every 6 hours PRN For aPTT between 40 - 57        CAPILLARY BLOOD GLUCOSE        I&O's Summary    15 Sep 2018 07:01  -  16 Sep 2018 07:00  --------------------------------------------------------  IN: 1300 mL / OUT: 0 mL / NET: 1300 mL        PHYSICAL EXAM  GENERAL: NAD, well-developed  HEAD:  Atraumatic, Normocephalic  EYES: EOMI, PERRLA, conjunctiva and sclera clear  NECK: Supple, No JVD  CHEST/LUNG: Clear to auscultation bilaterally; No wheeze  HEART: Regular rate and rhythm; No murmurs, rubs, or gallops  ABDOMEN: Soft, Nontender, Nondistended; Bowel sounds present  EXTREMITIES:  2+ Peripheral Pulses, No clubbing, cyanosis, or edema  PSYCH: AAOx3  SKIN: No rashes or lesions    LABS:                        15.9   7.53  )-----------( 182      ( 16 Sep 2018 07:57 )             45.2     09-16    139  |  104  |  13  ----------------------------<  86  4.0   |  23  |  1.22    Ca    9.3      16 Sep 2018 05:31  Phos  2.7     09-16  Mg     2.0     09-16    TPro  7.1  /  Alb  4.1  /  TBili  0.6  /  DBili  x   /  AST  20  /  ALT  13  /  AlkPhos  95  09-16    PT/INR - ( 16 Sep 2018 07:57 )   PT: 27.9 sec;   INR: 2.42 ratio         PTT - ( 16 Sep 2018 07:57 )  PTT:48.7 sec          RADIOLOGY & ADDITIONAL TESTS:    Imaging Personally Reviewed:  Consultant(s) Notes Reviewed:    Care Discussed with Consultants/Other Providers: Patient is a 58y old  Male who presents with a chief complaint of subtherapeutic INR, AICD replacement (15 Sep 2018 06:39)        SUBJECTIVE / OVERNIGHT EVENTS: No overnight events.      MEDICATIONS  (STANDING):  ascorbic acid 1000 milliGRAM(s) Oral daily  aspirin enteric coated 81 milliGRAM(s) Oral daily  carvedilol 12.5 milliGRAM(s) Oral every 12 hours  cephalexin 500 milliGRAM(s) Oral every 12 hours  cholecalciferol 2000 Unit(s) Oral daily  digoxin     Tablet 0.125 milliGRAM(s) Oral daily  heparin  Infusion.  Unit(s)/Hr (12 mL/Hr) IV Continuous <Continuous>  sacubitril 49 mG/valsartan 51 mG 1 Tablet(s) Oral two times a day  tamsulosin 0.4 milliGRAM(s) Oral at bedtime  warfarin 2 milliGRAM(s) Oral once    MEDICATIONS  (PRN):  heparin  Injectable 5500 Unit(s) IV Push every 6 hours PRN For aPTT less than 40  heparin  Injectable 2500 Unit(s) IV Push every 6 hours PRN For aPTT between 40 - 57        CAPILLARY BLOOD GLUCOSE        I&O's Summary    15 Sep 2018 07:01  -  16 Sep 2018 07:00  --------------------------------------------------------  IN: 1300 mL / OUT: 0 mL / NET: 1300 mL        PHYSICAL EXAM:  Constitutional: NAD, AAOx3, comfortable in bed.   Respiratory: Normal rate, rhythm, depth, effort. CTAB. No w/r/r.   Cardiovascular: RRR, normal S1 and S2, no m/r/g.   Gastrointestinal: +BS, soft NTND.   Extremities: wwp, no edema.   Vascular: Pulses equal and strong throughout.   Neurological: Cranial nerves grossly intact, strength and sensation intact throughout.   Skin: No gross skin abnormalities.       LABS:                        15.9   7.53  )-----------( 182      ( 16 Sep 2018 07:57 )             45.2     09-16    139  |  104  |  13  ----------------------------<  86  4.0   |  23  |  1.22    Ca    9.3      16 Sep 2018 05:31  Phos  2.7     09-16  Mg     2.0     09-16    TPro  7.1  /  Alb  4.1  /  TBili  0.6  /  DBili  x   /  AST  20  /  ALT  13  /  AlkPhos  95  09-16    PT/INR - ( 16 Sep 2018 07:57 )   PT: 27.9 sec;   INR: 2.42 ratio         PTT - ( 16 Sep 2018 07:57 )  PTT:48.7 sec          RADIOLOGY & ADDITIONAL TESTS:    Imaging Personally Reviewed:  Consultant(s) Notes Reviewed:    Care Discussed with Consultants/Other Providers:

## 2018-09-16 NOTE — PROGRESS NOTE ADULT - ASSESSMENT
57 y/o M with pmhx of HFrEF currently on LIFE VEST compliant , Afib, TIAs, Congential heart block s/p PPM in 1990 with upgrade to AICD in 2015, mitral valve replacement in 2000, on Coumadin (last taken Monday 9/10/18)  ,mechanical aortic valve replacement in 2000, in 2011 pt was upgraded to biventricular ICD from a PPM. Pt had generator change in 2015. At that time pt noted to have a high impedance of his SVC coil and his old ICD lead was abandoned with placement of new lead .  Pt was admitted in April of 2018 to [facility] where he was found to have persistent MSSA bacteremia thought to be possibly 2/2 infected leads with ICD removed and Micra placed 6/19/18 as pt is PPM dependent. Patient has been on Daptomycin since then and had a PICC line from April to end of July.  Patient now presents for reimplantation of ICD with Dr Butcher. He is s/p code stroke during this admission on 9/13 with MRI/ MRA head revealing no acute findings.     # HFrEF ( 20-25%)  # history of mechanical valve    - resume hep gtt; coumadin 6mg x1 now, then 2mg tonight  - Plan for BiV-ICD implantation/micra extraction on Monday; Keep NPO post MN Sunday and ensure an active type and screen for Monday.   - Life vest in place  - Continue current medical therapy for HF  - Continue to monitor on telemetry.    [id number]

## 2018-09-16 NOTE — PROGRESS NOTE ADULT - PROBLEM SELECTOR PLAN 2
-to be replaced Monday if INR is within goal range 3-3.5  -keep life vest on  -c/w Keflex 500mg -to be replaced 9/17 with lead extraction and AICD if INR is within goal range 3-3.5  -c/wlife vest on  -c/w Keflex 500mg

## 2018-09-16 NOTE — PROGRESS NOTE ADULT - PROBLEM SELECTOR PLAN 1
-on 6mg 7 days a week at home  -INR today 3.2  -per EP 6 mg coumadin today.    d/c Heparin. - Remains subtherapeutic at this time., pending lead extraction on 9/17/18  - Case personally discussed with Dr. Nila Andrade, will start patient on heparin drip 9per history, recent TIA within this past week), will also give coumadin 6mg STAT in AM with additional 2 mg at bedtime   -EP recs apreciated for BiV-ICD implantation/micra extraction on Monday 9/17; NPO at midnight

## 2018-09-17 ENCOUNTER — NON-APPOINTMENT (OUTPATIENT)
Age: 58
End: 2018-09-17

## 2018-09-17 LAB
ALBUMIN SERPL ELPH-MCNC: 4.1 G/DL — SIGNIFICANT CHANGE UP (ref 3.3–5)
ALP SERPL-CCNC: 86 U/L — SIGNIFICANT CHANGE UP (ref 40–120)
ALT FLD-CCNC: 18 U/L — SIGNIFICANT CHANGE UP (ref 10–45)
ANION GAP SERPL CALC-SCNC: 12 MMOL/L — SIGNIFICANT CHANGE UP (ref 5–17)
AST SERPL-CCNC: 27 U/L — SIGNIFICANT CHANGE UP (ref 10–40)
BILIRUB SERPL-MCNC: 0.6 MG/DL — SIGNIFICANT CHANGE UP (ref 0.2–1.2)
BLD GP AB SCN SERPL QL: NEGATIVE — SIGNIFICANT CHANGE UP
BUN SERPL-MCNC: 15 MG/DL — SIGNIFICANT CHANGE UP (ref 7–23)
CALCIUM SERPL-MCNC: 9.5 MG/DL — SIGNIFICANT CHANGE UP (ref 8.4–10.5)
CHLORIDE SERPL-SCNC: 101 MMOL/L — SIGNIFICANT CHANGE UP (ref 96–108)
CO2 SERPL-SCNC: 22 MMOL/L — SIGNIFICANT CHANGE UP (ref 22–31)
CREAT SERPL-MCNC: 1.3 MG/DL — SIGNIFICANT CHANGE UP (ref 0.5–1.3)
GLUCOSE SERPL-MCNC: 92 MG/DL — SIGNIFICANT CHANGE UP (ref 70–99)
HCT VFR BLD CALC: 41.9 % — SIGNIFICANT CHANGE UP (ref 39–50)
HGB BLD-MCNC: 14.8 G/DL — SIGNIFICANT CHANGE UP (ref 13–17)
INR BLD: 3.64 RATIO — HIGH (ref 0.88–1.16)
INR BLD: 4.16 RATIO — HIGH (ref 0.88–1.16)
MAGNESIUM SERPL-MCNC: 1.8 MG/DL — SIGNIFICANT CHANGE UP (ref 1.6–2.6)
MCHC RBC-ENTMCNC: 31.4 PG — SIGNIFICANT CHANGE UP (ref 27–34)
MCHC RBC-ENTMCNC: 35.3 GM/DL — SIGNIFICANT CHANGE UP (ref 32–36)
MCV RBC AUTO: 89 FL — SIGNIFICANT CHANGE UP (ref 80–100)
PHOSPHATE SERPL-MCNC: 3 MG/DL — SIGNIFICANT CHANGE UP (ref 2.5–4.5)
PLATELET # BLD AUTO: 168 K/UL — SIGNIFICANT CHANGE UP (ref 150–400)
POTASSIUM SERPL-MCNC: 4 MMOL/L — SIGNIFICANT CHANGE UP (ref 3.5–5.3)
POTASSIUM SERPL-SCNC: 4 MMOL/L — SIGNIFICANT CHANGE UP (ref 3.5–5.3)
PROT SERPL-MCNC: 6.9 G/DL — SIGNIFICANT CHANGE UP (ref 6–8.3)
PROTHROM AB SERPL-ACNC: 40.4 SEC — HIGH (ref 9.8–12.7)
PROTHROM AB SERPL-ACNC: 46.3 SEC — HIGH (ref 9.8–12.7)
RBC # BLD: 4.71 M/UL — SIGNIFICANT CHANGE UP (ref 4.2–5.8)
RBC # FLD: 13.8 % — SIGNIFICANT CHANGE UP (ref 10.3–14.5)
RH IG SCN BLD-IMP: POSITIVE — SIGNIFICANT CHANGE UP
SODIUM SERPL-SCNC: 135 MMOL/L — SIGNIFICANT CHANGE UP (ref 135–145)
WBC # BLD: 6.75 K/UL — SIGNIFICANT CHANGE UP (ref 3.8–10.5)
WBC # FLD AUTO: 6.75 K/UL — SIGNIFICANT CHANGE UP (ref 3.8–10.5)

## 2018-09-17 PROCEDURE — 33249 INSJ/RPLCMT DEFIB W/LEAD(S): CPT | Mod: 78,59

## 2018-09-17 PROCEDURE — 99233 SBSQ HOSP IP/OBS HIGH 50: CPT | Mod: GC

## 2018-09-17 PROCEDURE — 71045 X-RAY EXAM CHEST 1 VIEW: CPT | Mod: 26

## 2018-09-17 PROCEDURE — 93010 ELECTROCARDIOGRAM REPORT: CPT

## 2018-09-17 PROCEDURE — 33233 REMOVAL OF PM GENERATOR: CPT | Mod: 78

## 2018-09-17 RX ORDER — WARFARIN SODIUM 2.5 MG/1
2 TABLET ORAL ONCE
Qty: 0 | Refills: 0 | Status: COMPLETED | OUTPATIENT
Start: 2018-09-17 | End: 2018-09-17

## 2018-09-17 RX ORDER — SODIUM CHLORIDE 9 MG/ML
250 INJECTION, SOLUTION INTRAVENOUS ONCE
Qty: 0 | Refills: 0 | Status: COMPLETED | OUTPATIENT
Start: 2018-09-17 | End: 2018-09-17

## 2018-09-17 RX ORDER — ONDANSETRON 8 MG/1
4 TABLET, FILM COATED ORAL ONCE
Qty: 0 | Refills: 0 | Status: COMPLETED | OUTPATIENT
Start: 2018-09-17 | End: 2018-09-17

## 2018-09-17 RX ORDER — TRAMADOL HYDROCHLORIDE 50 MG/1
25 TABLET ORAL ONCE
Qty: 0 | Refills: 0 | Status: DISCONTINUED | OUTPATIENT
Start: 2018-09-17 | End: 2018-09-17

## 2018-09-17 RX ORDER — SODIUM CHLORIDE 9 MG/ML
500 INJECTION, SOLUTION INTRAVENOUS ONCE
Qty: 0 | Refills: 0 | Status: DISCONTINUED | OUTPATIENT
Start: 2018-09-17 | End: 2018-09-17

## 2018-09-17 RX ADMIN — SACUBITRIL AND VALSARTAN 1 TABLET(S): 24; 26 TABLET, FILM COATED ORAL at 05:11

## 2018-09-17 RX ADMIN — Medication 2000 UNIT(S): at 18:07

## 2018-09-17 RX ADMIN — SODIUM CHLORIDE 500 MILLILITER(S): 9 INJECTION, SOLUTION INTRAVENOUS at 13:26

## 2018-09-17 RX ADMIN — TRAMADOL HYDROCHLORIDE 25 MILLIGRAM(S): 50 TABLET ORAL at 22:00

## 2018-09-17 RX ADMIN — ONDANSETRON 4 MILLIGRAM(S): 8 TABLET, FILM COATED ORAL at 11:30

## 2018-09-17 RX ADMIN — TAMSULOSIN HYDROCHLORIDE 0.4 MILLIGRAM(S): 0.4 CAPSULE ORAL at 21:32

## 2018-09-17 RX ADMIN — CARVEDILOL PHOSPHATE 12.5 MILLIGRAM(S): 80 CAPSULE, EXTENDED RELEASE ORAL at 05:11

## 2018-09-17 RX ADMIN — Medication 0.12 MILLIGRAM(S): at 05:11

## 2018-09-17 RX ADMIN — Medication 500 MILLIGRAM(S): at 18:07

## 2018-09-17 RX ADMIN — Medication 81 MILLIGRAM(S): at 18:07

## 2018-09-17 RX ADMIN — WARFARIN SODIUM 2 MILLIGRAM(S): 2.5 TABLET ORAL at 18:07

## 2018-09-17 RX ADMIN — Medication 500 MILLIGRAM(S): at 05:11

## 2018-09-17 RX ADMIN — TRAMADOL HYDROCHLORIDE 25 MILLIGRAM(S): 50 TABLET ORAL at 21:32

## 2018-09-17 RX ADMIN — Medication 1000 MILLIGRAM(S): at 18:07

## 2018-09-17 NOTE — PROGRESS NOTE ADULT - PROBLEM SELECTOR PLAN 2
-to be replaced 9/17 with lead extraction and AICD if INR is within goal range 3-3.5  -c/wlife vest on  -c/w Keflex 500mg -to be replaced 9/17 with lead extraction and AICD if INR is within goal range 3-3.5  -c/w life vest on  -c/w Keflex 500mg

## 2018-09-17 NOTE — PROGRESS NOTE ADULT - PROBLEM SELECTOR PLAN 1
-INR at 3.6 this am, EP recs apreciated for BiV-ICD implantation/micra extraction today; will check INR at 3pm.   -goal INR 3-3.5 given mech MV

## 2018-09-17 NOTE — PROVIDER CONTACT NOTE (OTHER) - ASSESSMENT
pt asymptomatic, no complaints of chest pain, dizziness, sob  pt wearing lifevest, awaiting AICD placement today

## 2018-09-17 NOTE — PROGRESS NOTE ADULT - SUBJECTIVE AND OBJECTIVE BOX
Note Author: Oli Barragan, PGY 2  [facility] Pager: [phone]  [facility] Pager: [phone]  Spectra: [phone]      Patient is a 58y old  Male who presents with a chief complaint of bacteremia (16 Sep 2018 11:00)      SUBJECTIVE / OVERNIGHT EVENTS:  No acute events overnight. Patient seen and examined in AM. Patient denied fevers, CP, SOB, lower extremity pain. Pt pending AICD replacement today.      MEDICATIONS  (STANDING):  ascorbic acid 1000 milliGRAM(s) Oral daily  aspirin enteric coated 81 milliGRAM(s) Oral daily  carvedilol 12.5 milliGRAM(s) Oral every 12 hours  cephalexin 500 milliGRAM(s) Oral every 12 hours  cholecalciferol 2000 Unit(s) Oral daily  digoxin     Tablet 0.125 milliGRAM(s) Oral daily  sacubitril 49 mG/valsartan 51 mG 1 Tablet(s) Oral two times a day  tamsulosin 0.4 milliGRAM(s) Oral at bedtime    MEDICATIONS  (PRN):    CAPILLARY BLOOD GLUCOSE    I&O's Summary    16 Sep 2018 07:01  -  17 Sep 2018 07:00  --------------------------------------------------------  IN: 1140 mL / OUT: 0 mL / NET: 1140 mL    Vital Signs Last 24 Hrs  T(C): 36.3 (17 Sep 2018 12:10), Max: 36.8 (17 Sep 2018 04:49)  T(F): 97.4 (17 Sep 2018 12:10), Max: 98.3 (17 Sep 2018 04:49)  HR: 73 (17 Sep 2018 12:10) (68 - 87)  BP: 84/49 (17 Sep 2018 12:10) (84/49 - 95/62)  BP(mean): --  RR: 16 (17 Sep 2018 12:10) (16 - 18)  SpO2: 97% (17 Sep 2018 12:10) (95% - 98%)    PHYSICAL EXAM:  General: NAD, well-developed  Eyes: EOMI  Neck: Supple, No JVD  Chest/Lung: Clear to auscultation bilaterally; No wheezes  Heart: Regular rate and rhythm; sys murmur. Capillary refill WNL  Abdom: Soft, Nontender, Nondistended; Bowel sounds present  Extremities: No lower extremity edema.   Psych: AAOx3  Neurology: non-focal, strength and sensation grossly intact UE and LE BL. No spinal TTP  Skin: No rashes or lesions    LABS:                        14.8   6.75  )-----------( 168      ( 17 Sep 2018 09:26 )             41.9       09-17    135  |  101  |  15  ----------------------------<  92  4.0   |  22  |  1.30    Ca    9.5      17 Sep 2018 07:23  Phos  3.0     09-17  Mg     1.8     09-17    TPro  6.9  /  Alb  4.1  /  TBili  0.6  /  DBili  x   /  AST  27  /  ALT  18  /  AlkPhos  86  09-17      PT/INR - ( 17 Sep 2018 06:52 )   PT: 40.4 sec;   INR: 3.64 ratio         PTT - ( 16 Sep 2018 16:41 )  PTT:> 200 sec                  EKG:   CXR:       RADIOLOGY & ADDITIONAL TESTS:    Imaging Personally Reviewed:    Consultant(s) Notes Reviewed:      Care Discussed with Consultants/Other Providers:

## 2018-09-17 NOTE — CHART NOTE - NSCHARTNOTEFT_GEN_A_CORE
BRIEF PROCEDURE NOTE    RYAN GONSALVES  [id number]    Pre-op Diagnosis: CHB, CM, CHF    Post-op Diagnosis: same    Procedure: Micra removal, temp wire, BiV ICD implant    Electrophysiologist: Marisela Butcher MD    Assistant: none    Anesthesia: local, IV sedation    Access: R/F FV, R axillary/cephalic    Description:   LFV:  6Fr sheath, 5Fr quad to RVA  RFV: Micra sheath  via micra sheath, agilis placed, micra snared with goose neck snare and removed  sheath removed, figure of 8 stich for hemostasis    R cephalic cutdown, wire placed  right axillary puncture; CS cannulated and Lead to high lateral branch  ICD lead to RV septum  New [employer]onic BiV ICD placed  VVIR     Complications: none    EBL: 30cc    Disposition: recovery    Plan:  Check stat INR at 3PM, base tonights coumadin dose on this  continue Ab  Stat CXR and CXR in AM  No heparin/lovinox  remove stich in AM

## 2018-09-17 NOTE — PROGRESS NOTE ADULT - SUBJECTIVE AND OBJECTIVE BOX
24H hour events: s/p micra PPM explant with BiV ICD re-implant today    MEDICATIONS:  aspirin enteric coated 81 milliGRAM(s) Oral daily  carvedilol 12.5 milliGRAM(s) Oral every 12 hours  digoxin     Tablet 0.125 milliGRAM(s) Oral daily  sacubitril 49 mG/valsartan 51 mG 1 Tablet(s) Oral two times a day  tamsulosin 0.4 milliGRAM(s) Oral at bedtime    cephalexin 500 milliGRAM(s) Oral every 12 hours    ascorbic acid 1000 milliGRAM(s) Oral daily  cholecalciferol 2000 Unit(s) Oral daily      REVIEW OF SYSTEMS:  Complete 10point ROS negative.    PHYSICAL EXAM:  T(C): 36.3 (09-17-18 @ 12:10), Max: 36.8 (09-17-18 @ 04:49)  HR: 67 (09-17-18 @ 13:15) (65 - 87)  BP: 91/59 (09-17-18 @ 13:15) (76/52 - 95/62)  RR: 16 (09-17-18 @ 13:15) (16 - 18)  SpO2: 97% (09-17-18 @ 13:15) (95% - 99%)  I&O's Summary    16 Sep 2018 07:01  -  17 Sep 2018 07:00  --------------------------------------------------------  IN: 1140 mL / OUT: 0 mL / NET: 1140 mL      Appearance: NAD, lying comfortably in bed  HEENT:   Normal oral mucosa, PERRL, EOMI	  Cardiovascular: Normal S1 S2, No JVD, No murmurs  Respiratory: Lungs clear to auscultation	  Psychiatry: A & O x 3, Mood & affect appropriate  Gastrointestinal:  Soft, Non-tender, + BS	  Skin: Right chest wall BiV ICD site C/D/I with pressure dressing in place 	  Neurologic: Non-focal  Extremities: Normal range of motion, No clubbing, cyanosis or edema. B/L groin surgical incision site C/D/I with stitch on right groin.   Vascular: Peripheral pulses palpable 2+ bilaterally        LABS:	 	    CBC Full  -  ( 17 Sep 2018 09:26 )  WBC Count : 6.75 K/uL  Hemoglobin : 14.8 g/dL  Hematocrit : 41.9 %  Platelet Count - Automated : 168 K/uL  Mean Cell Volume : 89.0 fl  Mean Cell Hemoglobin : 31.4 pg  Mean Cell Hemoglobin Concentration : 35.3 gm/dL      09-17    135  |  101  |  15  ----------------------------<  92  4.0   |  22  |  1.30  09-16    139  |  104  |  13  ----------------------------<  86  4.0   |  23  |  1.22    Ca    9.5      17 Sep 2018 07:23  Ca    9.3      16 Sep 2018 05:31  Phos  3.0     09-17  Phos  2.7     09-16  Mg     1.8     09-17  Mg     2.0     09-16    TPro  6.9  /  Alb  4.1  /  TBili  0.6  /  DBili  x   /  AST  27  /  ALT  18  /  AlkPhos  86  09-17  TPro  7.1  /  Alb  4.1  /  TBili  0.6  /  DBili  x   /  AST  20  /  ALT  13  /  AlkPhos  95  09-16      TELEMETRY: Afib/ V paced at 60's, 5 beats NSVT last night   	    ECG: Afib/BiV paced, QRSd 188ms

## 2018-09-17 NOTE — PROGRESS NOTE ADULT - ASSESSMENT
59 y/o M with pmhx of HFrEF currently on LIFE VEST compliant , Afib, TIAs, Congential heart block s/p PPM in 1990 with upgrade to AICD in 2015, mitral valve replacement in 2000, on Coumadin (last taken Monday 9/10/18),mechanical aortic valve replacement in 2000, in 2011 pt was upgraded to biventricular ICD from a PPM. Pt had generator change in 2015. At that time pt noted to have a high impedance of his SVC coil and his old ICD lead was abandoned with placement of new lead. Pt was admitted in April of 2018 to [facility] where he was found to have persistent MSSA bacteremia thought to be possibly 2/2 infected leads with ICD removed and Micra placed 6/19/18 as pt is PPM dependent with lifevest in place. Patient has been on Daptomycin since then and had a PICC line from April to end of July.  Patient now presents for reimplantation of BiV ICD with Dr Butcher. He is s/p code stroke during this admission on 9/13 with MRI/ MRA head revealing no acute findings. Now s/p micra PPM explant and re-implant with BiV ICD (MDT) today.     # HFrEF ( 20-25%)  # history of mechanical valve    - s/p micra PPM explant and re-implant with BiV ICD (MDT) today; tolerated procedure well  - Post procedure restrictions/teaching enforced with patient, BiV ICD booklet/fact sheet/temp. ID card given to pt  - Repeat STAT INR at 3pm today for coumadin dosing tonight   - Right groin stitch to be removed in am by EPS   - Continue current medical therapy for HF  - Likely discharge in am or on Wednesday pending INR trend.     [id number] 57 y/o M with pmhx of HFrEF currently on LIFE VEST compliant , Afib, TIAs, Congential heart block s/p PPM in 1990 with upgrade to AICD in 2015, mitral valve replacement in 2000, on Coumadin (last taken Monday 9/10/18),mechanical aortic valve replacement in 2000, in 2011 pt was upgraded to biventricular ICD from a PPM. Pt had generator change in 2015. At that time pt noted to have a high impedance of his SVC coil and his old ICD lead was abandoned with placement of new lead. Pt was admitted in April of 2018 to [facility] where he was found to have persistent MSSA bacteremia thought to be possibly 2/2 infected leads with ICD removed and Micra placed 6/19/18 as pt is PPM dependent with lifevest in place. Patient has been on Daptomycin since then and had a PICC line from April to end of July.  Patient now presents for reimplantation of BiV ICD with Dr Butcher. He is s/p code stroke during this admission on 9/13 with MRI/ MRA head revealing no acute findings. Now s/p micra PPM explant and re-implant with BiV ICD (MDT) today.     # HFrEF ( 20-25%)  # history of mechanical valve    - s/p micra PPM explant and re-implant with BiV ICD (MDT) today; tolerated procedure well  - Post procedure restrictions/teaching enforced with patient, BiV ICD booklet/fact sheet/temp. ID card given to pt  - Repeat STAT INR at 3pm today for coumadin dosing tonight   - Right groin stitch to be removed in am by EPS   - Continue current medical therapy for HF  - Likely discharge in am or on Wednesday pending INR trend.     [id number]    ADDENDUM (9/17/18 AT 4:23PM): Repeat INR 4.16, please give Coumadin 2mg tonight given patient has mechanical AVR. Plan discussed with Dr. Butcher and Team 1.

## 2018-09-18 ENCOUNTER — TRANSCRIPTION ENCOUNTER (OUTPATIENT)
Age: 58
End: 2018-09-18

## 2018-09-18 VITALS
OXYGEN SATURATION: 97 % | SYSTOLIC BLOOD PRESSURE: 97 MMHG | RESPIRATION RATE: 18 BRPM | TEMPERATURE: 98 F | DIASTOLIC BLOOD PRESSURE: 61 MMHG | HEART RATE: 61 BPM

## 2018-09-18 LAB
ANION GAP SERPL CALC-SCNC: 10 MMOL/L — SIGNIFICANT CHANGE UP (ref 5–17)
APTT BLD: 52.3 SEC — HIGH (ref 27.5–37.4)
APTT BLD: 54.1 SEC — HIGH (ref 27.5–37.4)
BUN SERPL-MCNC: 14 MG/DL — SIGNIFICANT CHANGE UP (ref 7–23)
CALCIUM SERPL-MCNC: 9 MG/DL — SIGNIFICANT CHANGE UP (ref 8.4–10.5)
CHLORIDE SERPL-SCNC: 101 MMOL/L — SIGNIFICANT CHANGE UP (ref 96–108)
CO2 SERPL-SCNC: 23 MMOL/L — SIGNIFICANT CHANGE UP (ref 22–31)
CREAT SERPL-MCNC: 1.25 MG/DL — SIGNIFICANT CHANGE UP (ref 0.5–1.3)
GLUCOSE SERPL-MCNC: 90 MG/DL — SIGNIFICANT CHANGE UP (ref 70–99)
HCT VFR BLD CALC: 39.4 % — SIGNIFICANT CHANGE UP (ref 39–50)
HGB BLD-MCNC: 13.4 G/DL — SIGNIFICANT CHANGE UP (ref 13–17)
INR BLD: 3.98 RATIO — HIGH (ref 0.88–1.16)
MAGNESIUM SERPL-MCNC: 1.6 MG/DL — SIGNIFICANT CHANGE UP (ref 1.6–2.6)
MCHC RBC-ENTMCNC: 30.9 PG — SIGNIFICANT CHANGE UP (ref 27–34)
MCHC RBC-ENTMCNC: 34 GM/DL — SIGNIFICANT CHANGE UP (ref 32–36)
MCV RBC AUTO: 90.8 FL — SIGNIFICANT CHANGE UP (ref 80–100)
PHOSPHATE SERPL-MCNC: 3.3 MG/DL — SIGNIFICANT CHANGE UP (ref 2.5–4.5)
PLATELET # BLD AUTO: 163 K/UL — SIGNIFICANT CHANGE UP (ref 150–400)
POTASSIUM SERPL-MCNC: 4.1 MMOL/L — SIGNIFICANT CHANGE UP (ref 3.5–5.3)
POTASSIUM SERPL-SCNC: 4.1 MMOL/L — SIGNIFICANT CHANGE UP (ref 3.5–5.3)
PROTHROM AB SERPL-ACNC: 46.3 SEC — HIGH (ref 10–13.1)
RBC # BLD: 4.34 M/UL — SIGNIFICANT CHANGE UP (ref 4.2–5.8)
RBC # FLD: 13.7 % — SIGNIFICANT CHANGE UP (ref 10.3–14.5)
SODIUM SERPL-SCNC: 134 MMOL/L — LOW (ref 135–145)
WBC # BLD: 8.34 K/UL — SIGNIFICANT CHANGE UP (ref 3.8–10.5)
WBC # FLD AUTO: 8.34 K/UL — SIGNIFICANT CHANGE UP (ref 3.8–10.5)

## 2018-09-18 PROCEDURE — C1900: CPT

## 2018-09-18 PROCEDURE — C1894: CPT

## 2018-09-18 PROCEDURE — 71046 X-RAY EXAM CHEST 2 VIEWS: CPT

## 2018-09-18 PROCEDURE — 80048 BASIC METABOLIC PNL TOTAL CA: CPT

## 2018-09-18 PROCEDURE — 86901 BLOOD TYPING SEROLOGIC RH(D): CPT

## 2018-09-18 PROCEDURE — 71046 X-RAY EXAM CHEST 2 VIEWS: CPT | Mod: 26

## 2018-09-18 PROCEDURE — 86900 BLOOD TYPING SEROLOGIC ABO: CPT

## 2018-09-18 PROCEDURE — C1882: CPT

## 2018-09-18 PROCEDURE — C1730: CPT

## 2018-09-18 PROCEDURE — 99238 HOSP IP/OBS DSCHRG MGMT 30/<: CPT

## 2018-09-18 PROCEDURE — 84100 ASSAY OF PHOSPHORUS: CPT

## 2018-09-18 PROCEDURE — 82962 GLUCOSE BLOOD TEST: CPT

## 2018-09-18 PROCEDURE — 83735 ASSAY OF MAGNESIUM: CPT

## 2018-09-18 PROCEDURE — 80053 COMPREHEN METABOLIC PANEL: CPT

## 2018-09-18 PROCEDURE — 93005 ELECTROCARDIOGRAM TRACING: CPT

## 2018-09-18 PROCEDURE — 70450 CT HEAD/BRAIN W/O DYE: CPT

## 2018-09-18 PROCEDURE — 99239 HOSP IP/OBS DSCHRG MGMT >30: CPT

## 2018-09-18 PROCEDURE — 85610 PROTHROMBIN TIME: CPT

## 2018-09-18 PROCEDURE — 93010 ELECTROCARDIOGRAM REPORT: CPT

## 2018-09-18 PROCEDURE — 85027 COMPLETE CBC AUTOMATED: CPT

## 2018-09-18 PROCEDURE — C1766: CPT

## 2018-09-18 PROCEDURE — 70551 MRI BRAIN STEM W/O DYE: CPT

## 2018-09-18 PROCEDURE — 70544 MR ANGIOGRAPHY HEAD W/O DYE: CPT

## 2018-09-18 PROCEDURE — 86850 RBC ANTIBODY SCREEN: CPT

## 2018-09-18 PROCEDURE — C1769: CPT

## 2018-09-18 PROCEDURE — C1777: CPT

## 2018-09-18 PROCEDURE — C1892: CPT

## 2018-09-18 PROCEDURE — 33249 INSJ/RPLCMT DEFIB W/LEAD(S): CPT

## 2018-09-18 PROCEDURE — 70547 MR ANGIOGRAPHY NECK W/O DYE: CPT

## 2018-09-18 PROCEDURE — 71045 X-RAY EXAM CHEST 1 VIEW: CPT

## 2018-09-18 PROCEDURE — 85730 THROMBOPLASTIN TIME PARTIAL: CPT

## 2018-09-18 PROCEDURE — C1887: CPT

## 2018-09-18 PROCEDURE — C1889: CPT

## 2018-09-18 RX ORDER — CEPHALEXIN 500 MG
1 CAPSULE ORAL
Qty: 60 | Refills: 0 | OUTPATIENT
Start: 2018-09-18 | End: 2018-10-17

## 2018-09-18 RX ORDER — ASPIRIN/CALCIUM CARB/MAGNESIUM 324 MG
1 TABLET ORAL
Qty: 0 | Refills: 0 | COMMUNITY

## 2018-09-18 RX ORDER — ASCORBIC ACID 60 MG
1 TABLET,CHEWABLE ORAL
Qty: 0 | Refills: 0 | COMMUNITY

## 2018-09-18 RX ORDER — CHOLECALCIFEROL (VITAMIN D3) 125 MCG
2000 CAPSULE ORAL
Qty: 0 | Refills: 0 | COMMUNITY
Start: 2018-09-18

## 2018-09-18 RX ORDER — SACUBITRIL AND VALSARTAN 24; 26 MG/1; MG/1
1 TABLET, FILM COATED ORAL
Qty: 0 | Refills: 0 | COMMUNITY
Start: 2018-09-18

## 2018-09-18 RX ORDER — CHOLECALCIFEROL (VITAMIN D3) 125 MCG
2 CAPSULE ORAL
Qty: 0 | Refills: 0 | COMMUNITY

## 2018-09-18 RX ORDER — TAMSULOSIN HYDROCHLORIDE 0.4 MG/1
1 CAPSULE ORAL
Qty: 0 | Refills: 0 | COMMUNITY
Start: 2018-09-18

## 2018-09-18 RX ORDER — SACUBITRIL AND VALSARTAN 24; 26 MG/1; MG/1
1 TABLET, FILM COATED ORAL
Qty: 0 | Refills: 0 | COMMUNITY

## 2018-09-18 RX ORDER — CARVEDILOL PHOSPHATE 80 MG/1
1 CAPSULE, EXTENDED RELEASE ORAL
Qty: 0 | Refills: 0 | COMMUNITY
Start: 2018-09-18

## 2018-09-18 RX ORDER — WARFARIN SODIUM 2.5 MG/1
6 TABLET ORAL ONCE
Qty: 0 | Refills: 0 | Status: COMPLETED | OUTPATIENT
Start: 2018-09-18 | End: 2018-09-18

## 2018-09-18 RX ORDER — ASPIRIN/CALCIUM CARB/MAGNESIUM 324 MG
1 TABLET ORAL
Qty: 0 | Refills: 0 | COMMUNITY
Start: 2018-09-18

## 2018-09-18 RX ORDER — MAGNESIUM SULFATE 500 MG/ML
1 VIAL (ML) INJECTION ONCE
Qty: 0 | Refills: 0 | Status: COMPLETED | OUTPATIENT
Start: 2018-09-18 | End: 2018-09-18

## 2018-09-18 RX ORDER — DIGOXIN 250 MCG
1 TABLET ORAL
Qty: 0 | Refills: 0 | COMMUNITY
Start: 2018-09-18

## 2018-09-18 RX ORDER — ASCORBIC ACID 60 MG
1 TABLET,CHEWABLE ORAL
Qty: 0 | Refills: 0 | COMMUNITY
Start: 2018-09-18

## 2018-09-18 RX ORDER — TRAMADOL HYDROCHLORIDE 50 MG/1
25 TABLET ORAL ONCE
Qty: 0 | Refills: 0 | Status: DISCONTINUED | OUTPATIENT
Start: 2018-09-18 | End: 2018-09-18

## 2018-09-18 RX ORDER — DIGOXIN 250 MCG
1 TABLET ORAL
Qty: 0 | Refills: 0 | COMMUNITY

## 2018-09-18 RX ORDER — CARVEDILOL PHOSPHATE 80 MG/1
1 CAPSULE, EXTENDED RELEASE ORAL
Qty: 0 | Refills: 0 | COMMUNITY

## 2018-09-18 RX ORDER — WARFARIN SODIUM 2.5 MG/1
6 TABLET ORAL
Qty: 180 | Refills: 0 | OUTPATIENT
Start: 2018-09-18 | End: 2018-10-17

## 2018-09-18 RX ORDER — TAMSULOSIN HYDROCHLORIDE 0.4 MG/1
1 CAPSULE ORAL
Qty: 0 | Refills: 0 | COMMUNITY

## 2018-09-18 RX ADMIN — TRAMADOL HYDROCHLORIDE 25 MILLIGRAM(S): 50 TABLET ORAL at 03:22

## 2018-09-18 RX ADMIN — Medication 500 MILLIGRAM(S): at 05:21

## 2018-09-18 RX ADMIN — WARFARIN SODIUM 6 MILLIGRAM(S): 2.5 TABLET ORAL at 12:28

## 2018-09-18 RX ADMIN — Medication 2000 UNIT(S): at 12:28

## 2018-09-18 RX ADMIN — Medication 81 MILLIGRAM(S): at 12:28

## 2018-09-18 RX ADMIN — Medication 1000 MILLIGRAM(S): at 12:28

## 2018-09-18 RX ADMIN — TRAMADOL HYDROCHLORIDE 25 MILLIGRAM(S): 50 TABLET ORAL at 04:47

## 2018-09-18 NOTE — PROGRESS NOTE ADULT - PROBLEM SELECTOR PLAN 1
-INR at 3.6 this am, EP recs apreciated for BiV-ICD implantation/micra extraction today; will check INR at 3pm.   -goal INR 3-3.5 given mech MV -INR at 3.9 this am, EP recs appreciated   -s/p BiV-ICD implantation/micra extraction on 9/17/18.  -goal INR 3-3.5 given [facility] MV  -will f/u recommendation for the coumadin dose that pt will be discharged on  -f/u CXR today

## 2018-09-18 NOTE — DISCHARGE NOTE ADULT - MEDICATION SUMMARY - MEDICATIONS TO TAKE
I will START or STAY ON the medications listed below when I get home from the hospital:    eplerenone 25 mg oral tablet  -- 1 tab(s) by mouth once a day  -- Indication: For Chronic congestive heart failure, unspecified heart failure type    aspirin 81 mg oral delayed release tablet  -- 1 tab(s) by mouth once a day  -- Indication: For Transient ischemic attack (TIA)    sacubitril-valsartan 49 mg-51 mg oral tablet  -- 1 tab(s) by mouth 2 times a day  -- Indication: For Chronic congestive heart failure, unspecified heart failure type    tamsulosin 0.4 mg oral capsule  -- 1 cap(s) by mouth once a day (at bedtime)  -- Indication: For Benign prostatic hyperplasia without lower urinary tract symptoms    digoxin 125 mcg (0.125 mg) oral tablet  -- 1 tab(s) by mouth once a day  -- Indication: For Atrial fibrillation, unspecified type    warfarin 6 mg oral tablet  -- 6 milligram(s) by mouth once a day   -- Indication: For H/O heart valve replacement with mechanical valve    carvedilol 12.5 mg oral tablet  -- 1 tab(s) by mouth every 12 hours  -- Indication: For Chronic congestive heart failure, unspecified heart failure type    cephalexin 500 mg oral capsule  -- 1 cap(s) by mouth every 12 hours  -- Indication: For AICD problem    magnesium carbonate 250 mg oral capsule  -- 1 cap(s) by mouth once a day  home  -- Indication: For Cardiac conduction disorder    Probiotic Formula oral capsule  -- 1 cap(s) by mouth 2 times a day  Home  -- Indication: For AICD problem    Centrum oral tablet  -- 1 tab(s) by mouth once a day  -- Indication: For AICD problem    ascorbic acid 1000 mg oral tablet  -- 1 tab(s) by mouth once a day  -- Indication: For AICD problem    cholecalciferol oral tablet  -- 2000 unit(s) by mouth once a day  -- Indication: For AICD problem

## 2018-09-18 NOTE — PROGRESS NOTE ADULT - SUBJECTIVE AND OBJECTIVE BOX
OVERNIGHT EVENTS:    SUBJECTIVE:    Vital Signs Last 12 Hrs  T(F): 98 (09-18-18 @ 05:27), Max: 98 (09-18-18 @ 05:27)  HR: 64 (09-18-18 @ 05:27) (61 - 64)  BP: 88/50 (09-18-18 @ 05:27) (88/50 - 106/66)  BP(mean): --  RR: 18 (09-18-18 @ 05:27) (18 - 18)  SpO2: 95% (09-18-18 @ 05:27) (95% - 95%)  I&O's Summary    17 Sep 2018 07:01  -  18 Sep 2018 07:00  --------------------------------------------------------  IN: 710 mL / OUT: 0 mL / NET: 710 mL        PHYSICAL EXAM:    Constitutional: NAD, AAOx3, comfortable in bed.   Respiratory: Normal rate, rhythm, depth, effort. CTAB. No w/r/r.   Cardiovascular: RRR, normal S1 and S2, no m/r/g.   Gastrointestinal: +BS, soft NTND.   Extremities: wwp, no edema.   Vascular: Pulses equal and strong throughout.   Neurological: Cranial nerves grossly intact, strength and sensation intact throughout.   Skin: No gross skin abnormalities.         LABS:                        14.8   6.75  )-----------( 168      ( 17 Sep 2018 09:26 )             41.9     09-18    134<L>  |  101  |  14  ----------------------------<  90  4.1   |  23  |  1.25    Ca    9.0      18 Sep 2018 05:16  Phos  3.3     09-18  Mg     1.6     09-18    TPro  6.9  /  Alb  4.1  /  TBili  0.6  /  DBili  x   /  AST  27  /  ALT  18  /  AlkPhos  86  09-17    PT/INR - ( 17 Sep 2018 15:08 )   PT: 46.3 sec;   INR: 4.16 ratio         PTT - ( 18 Sep 2018 05:17 )  PTT:54.1 sec      RADIOLOGY & ADDITIONAL TESTS:    MEDICATIONS  (STANDING):  ascorbic acid 1000 milliGRAM(s) Oral daily  aspirin enteric coated 81 milliGRAM(s) Oral daily  carvedilol 12.5 milliGRAM(s) Oral every 12 hours  cephalexin 500 milliGRAM(s) Oral every 12 hours  cholecalciferol 2000 Unit(s) Oral daily  digoxin     Tablet 0.125 milliGRAM(s) Oral daily  magnesium sulfate  IVPB 1 Gram(s) IV Intermittent once  sacubitril 49 mG/valsartan 51 mG 1 Tablet(s) Oral two times a day  tamsulosin 0.4 milliGRAM(s) Oral at bedtime    MEDICATIONS  (PRN): OVERNIGHT EVENTS: Tramadol given for shoulder pain    SUBJECTIVE: No acute complaints this morning. Patient expressed desire to go home today. Was asymptomatic during 6 beats of bradycardia with an underlying rhythm of a-fib followed by 13 beats of AIVR. Patient refused magnesium supplementation this morning bceause he was worried his INR will decrease too much.    Vital Signs Last 12 Hrs  T(F): 98 (09-18-18 @ 05:27), Max: 98 (09-18-18 @ 05:27)  HR: 64 (09-18-18 @ 05:27) (61 - 64)  BP: 88/50 (09-18-18 @ 05:27) (88/50 - 106/66)  BP(mean): --  RR: 18 (09-18-18 @ 05:27) (18 - 18)  SpO2: 95% (09-18-18 @ 05:27) (95% - 95%)  I&O's Summary    17 Sep 2018 07:01  -  18 Sep 2018 07:00  --------------------------------------------------------  IN: 710 mL / OUT: 0 mL / NET: 710 mL        PHYSICAL EXAM:    Constitutional: NAD, AAOx3, comfortable in bed.   Respiratory: Normal rate, rhythm, depth, effort. CTAB. No w/r/r.   Cardiovascular: RRR, normal S1 and S2, no m/r/g.   Gastrointestinal: +BS, soft NTND.   Extremities: wwp, no edema.   Vascular: Pulses equal and strong throughout.   Neurological: Cranial nerves grossly intact, strength and sensation intact throughout.   Skin: No gross skin abnormalities.         LABS:                        14.8   6.75  )-----------( 168      ( 17 Sep 2018 09:26 )             41.9     09-18    134<L>  |  101  |  14  ----------------------------<  90  4.1   |  23  |  1.25    Ca    9.0      18 Sep 2018 05:16  Phos  3.3     09-18  Mg     1.6     09-18    TPro  6.9  /  Alb  4.1  /  TBili  0.6  /  DBili  x   /  AST  27  /  ALT  18  /  AlkPhos  86  09-17    PT/INR - ( 17 Sep 2018 15:08 )   PT: 46.3 sec;   INR: 4.16 ratio         PTT - ( 18 Sep 2018 05:17 )  PTT:54.1 sec      RADIOLOGY & ADDITIONAL TESTS:    MEDICATIONS  (STANDING):  ascorbic acid 1000 milliGRAM(s) Oral daily  aspirin enteric coated 81 milliGRAM(s) Oral daily  carvedilol 12.5 milliGRAM(s) Oral every 12 hours  cephalexin 500 milliGRAM(s) Oral every 12 hours  cholecalciferol 2000 Unit(s) Oral daily  digoxin     Tablet 0.125 milliGRAM(s) Oral daily  magnesium sulfate  IVPB 1 Gram(s) IV Intermittent once  sacubitril 49 mG/valsartan 51 mG 1 Tablet(s) Oral two times a day  tamsulosin 0.4 milliGRAM(s) Oral at bedtime    MEDICATIONS  (PRN):

## 2018-09-18 NOTE — PROGRESS NOTE ADULT - ASSESSMENT
57 y/o M with pmhx  Afib, TIAs, Congential heart block s/p PPM in 1990 with upgrade to AICD in 2015 HFrEF ( 20-25%) , mechanical mitral valve replacement in 2000, on Coumadin, mechanical aortic valve replacement in 2000, in 2011 pt was upgraded to biventricular ICD from a PPM. Pt had generator change in 2015. At that time pt noted to have a high impedance of his SVC coil and his old ICD lead was abandoned with placement of new lead. Pt was admitted in April of 2018 to [facility] where he was found to have persistent MSSA bacteremia thought to be possibly 2/2 infected leads with ICD removed and Micra placed 6/19/18 as pt is PPM dependent with lifevest in place. Patient had been on Daptomycin since then and had a PICC line from April to end of July.   s/p code stroke during this admission on 9/13 with MRI/ MRA head revealing no acute findings. Now s/p micra PPM explant and re-implant with BiV ICD (MDT) 9/17/18

## 2018-09-18 NOTE — PROVIDER CONTACT NOTE (OTHER) - RECOMMENDATIONS
MD made aware, no new orders. AS per EP- AICD was being check around this time Will continue to observer

## 2018-09-18 NOTE — PROGRESS NOTE ADULT - REASON FOR ADMISSION
ICD implant
bacteremia
subtherapeutic INR, AICD replacement
AICD
AICD replacement, subtherapeutic INR
subtherapeutic INR, AICD replacement

## 2018-09-18 NOTE — DISCHARGE NOTE ADULT - PROVIDER TOKENS
FREE:[LAST:[Postighone],FIRST:[Stewart],PHONE:[(   )    -],FAX:[(   )    -]],TOKEN:'[id number]'

## 2018-09-18 NOTE — DISCHARGE NOTE ADULT - PATIENT PORTAL LINK FT
You can access the [facility] Patient Portal, offered by [facility], by registering with the following website: [email or web address]

## 2018-09-18 NOTE — PROGRESS NOTE ADULT - PROVIDER SPECIALTY LIST ADULT
Cardiology
Electrophysiology
Internal Medicine

## 2018-09-18 NOTE — PROGRESS NOTE ADULT - PROBLEM SELECTOR PLAN 1
s/p micra PPM explant and re-implant with BiV ICD (MDT)  post device implant teaching reinforced with patient   monitor telemetry   keep K+>4, MG++>2  continue BB , digoxin , entresto ASA 81mg  continue with coumadin INR goal 4 mechanical valves hx TIA  monitor for bleeding groins and right implant site   dose today coumadin prior to discharge 6mg , tomorrow 6 mg   discussed with patient to get INR checked tomorrow AM very important to follow up with results for INR dosing  cleared for discharge home to follow up post op appointment in [place]

## 2018-09-18 NOTE — DISCHARGE NOTE ADULT - MEDICATION SUMMARY - MEDICATIONS TO STOP TAKING
I will STOP taking the medications listed below when I get home from the hospital:    cefadroxil 500 mg oral capsule  -- 1 cap(s) by mouth once a day  Home

## 2018-09-18 NOTE — PROGRESS NOTE ADULT - PROBLEM SELECTOR PROBLEM 4
Chronic congestive heart failure, unspecified heart failure type

## 2018-09-18 NOTE — PROGRESS NOTE ADULT - ATTENDING COMMENTS
s/p micra PPM explant with BiV ICD re-implant today  Will check CXR today and again in AM.   Check INR for coumadin dosing
CXR reviewed, no PTX  EP removed dressing this am.   INR above goal of 3-3.5. Will f/u with EP regarding Coumadin dosing. Pt to follow up outpatient at his clinic for INR checks  Will be discharge on Keflex    Anticipate discharge home later today.   Discharge time spent 37 minutes
Patient seen and examined, presented for reimplantation of ICD.   EP follow up appreciated, patient for ICD placement on Monday, d/c Heparin with INR 3.2.  Coumadin 6 mg as per EP today. Monitor PT/INR.   Patient euvolemic, on cardiac meds.   Discussed plan at length with patient.
Patient seen and examined.   Patient scheduled to have AICD placement am tomorrow, noted to have Subtherapeutic INR, started patient on Heparin drip, Coumadin dosing 6 mg and 2 mg today.   Keep patient NPO.
Pt seen at bedside with significant other present. Discussed at length. Reported feeling anxious about INR, stating that medical team and Cardiologists needs to be on top of coumadin dosing in preparation for planned procedure Monday given events that transpired earlier in Hospitalization. Dosed Coumadin 5mg this am, check INR in am and dose again. On Keflex which likely will result in elevation in INR.

## 2018-09-18 NOTE — DISCHARGE NOTE ADULT - SECONDARY DIAGNOSIS.
Atrial fibrillation, unspecified type Benign prostatic hyperplasia without lower urinary tract symptoms Chronic congestive heart failure, unspecified heart failure type Left atrial thrombus H/O heart valve replacement with mechanical valve Transient ischemic attack (TIA)

## 2018-09-18 NOTE — DISCHARGE NOTE ADULT - CARE PLAN
Principal Discharge DX:	AICD problem  Assessment and plan of treatment:	Micra PPM explant at an outside hospital with BiVentricular ICD re-implanted on 9/17/2018. Please continue to take Keflex 500 daily to prevent infection.  Secondary Diagnosis:	Atrial fibrillation, unspecified type  Assessment and plan of treatment:	Continue to take warfarin, digoxin 125mcg and heparin drip, magnesium carbonate 250 mg as prescribed  and follow up with your cardiologist for further management.  Secondary Diagnosis:	Benign prostatic hyperplasia without lower urinary tract symptoms  Assessment and plan of treatment:	Continue to take Flomax 0.4 every night. Please see your primary care doctor if you develop problems with urination.  Secondary Diagnosis:	Chronic congestive heart failure, unspecified heart failure type  Assessment and plan of treatment:	Most recent Echo showed an EF of 25%. Continue to take Eplerenone 25mg QD, carvedilol 12.5mg QD, Entresto 49mg /51 mg BID as prescribed and follow up with your cardiologist for further management.  Secondary Diagnosis:	Left atrial thrombus  Assessment and plan of treatment:	Intra-operative MAURI from June 2018 showed Severe left atrial enlargement with Left atrial appendage thrombus. Continue with anticoagulation with Warfarin.  Secondary Diagnosis:	H/O heart valve replacement with mechanical valve  Goal:	INR between 3-3.5  Assessment and plan of treatment:	Please take your Coumadin as prescribed.  Secondary Diagnosis:	Transient ischemic attack (TIA) Principal Discharge DX:	AICD problem  Goal:	functioning device  Assessment and plan of treatment:	Micra PPM explant at an outside hospital with BiVentricular ICD re-implanted on 9/17/2018. Please continue to take Keflex 500 daily to prevent infection. Please follow up with your cardiologist in regards to how often to have the device checked.  Secondary Diagnosis:	Atrial fibrillation, unspecified type  Assessment and plan of treatment:	Continue to take Coumadin 6 mg daily, digoxin 125mcg and heparin drip, magnesium carbonate 250 mg as prescribed  and follow up with your cardiologist for further management.  Secondary Diagnosis:	Benign prostatic hyperplasia without lower urinary tract symptoms  Assessment and plan of treatment:	Continue to take Flomax 0.4 every night. Please see your primary care doctor if you develop problems with urination.  Secondary Diagnosis:	Chronic congestive heart failure, unspecified heart failure type  Assessment and plan of treatment:	Most recent Echo showed an EF of 25%. Continue to take Eplerenone 25mg QD, carvedilol 12.5mg QD, Entresto 49mg /51 mg BID as prescribed and follow up with your cardiologist for further management.  Secondary Diagnosis:	Left atrial thrombus  Assessment and plan of treatment:	Intra-operative MAURI from June 2018 showed Severe left atrial enlargement with Left atrial appendage thrombus. Continue with anticoagulation with Coumadin 6 mg daily.  Secondary Diagnosis:	H/O heart valve replacement with mechanical valve  Goal:	INR goal 4  Assessment and plan of treatment:	Please take your Coumadin as prescribed, 6 mg daily.  Secondary Diagnosis:	Transient ischemic attack (TIA)  Assessment and plan of treatment:	Please follow up with your primary care doctor about starting a statin as preventative therapy. If you experience facial droop, weakness, or any neurological change please go to the emergency room or call your primary care doctor immediately.

## 2018-09-18 NOTE — PROVIDER CONTACT NOTE (OTHER) - ACTION/TREATMENT ORDERED:
NP and code stroke team aware at bedside pt back from CT
NP aware, code stroke team aware.
Am meds held as ordered.
As per MD, no new orders at this time.  Will continue to monitor patient
MD made aware, no new orders. AS per EP- AICD was being check around this time Will continue to observer
md aware will order 250 bolus

## 2018-09-18 NOTE — PROGRESS NOTE ADULT - SUBJECTIVE AND OBJECTIVE BOX
HPI: feeling well     MEDICATIONS  (STANDING):  ascorbic acid 1000 milliGRAM(s) Oral daily  aspirin enteric coated 81 milliGRAM(s) Oral daily  carvedilol 12.5 milliGRAM(s) Oral every 12 hours  cephalexin 500 milliGRAM(s) Oral every 12 hours  cholecalciferol 2000 Unit(s) Oral daily  digoxin     Tablet 0.125 milliGRAM(s) Oral daily  magnesium sulfate  IVPB 1 Gram(s) IV Intermittent once  sacubitril 49 mG/valsartan 51 mG 1 Tablet(s) Oral two times a day  tamsulosin 0.4 milliGRAM(s) Oral at bedtime    allergies Percocet 5/325 (Rash)    REVIEW OF SYSTEM:    Constitutional: denies fever, chills, fatigue  Neuro: denies headache, numbness, weakness, dizziness  Resp: denies cough, wheezing, shortness of breath  CVS: denies chest pain, palpitations, leg swelling  GI: denies abdominal pain, nausea, vomiting, diarrhea   : denies dysuria, frequency, incontinence  Skin: denies itching, burning, rashes, or lesions   Msk: slight pain to groins and right implant site    Vital Signs Last 24 Hrs  T(C): 36.5 (18 Sep 2018 11:56), Max: 36.7 (18 Sep 2018 05:27)  T(F): 97.7 (18 Sep 2018 11:56), Max: 98 (18 Sep 2018 05:27)  HR: 61 (18 Sep 2018 11:56) (59 - 76)  BP: 97/61 (18 Sep 2018 11:56) (87/52 - 106/66)  RR: 18 (18 Sep 2018 11:56) (16 - 18)  SpO2: 97% (18 Sep 2018 11:56) (95% - 98%)    Physical Exam:  General : well developed, well nourished,  and no acute distress  Neuro : Alert and oriented x 3, no focal deficits  HEENT : Sclera clear, no JVD, no Lymphadenopathy , neck supple  Lungs: Clear to Ascultation, no wheezing , rales or rhonchi   Cardiovascular : + 1 +2, RRR, no murmurs, no rubs  GI : abdomen soft, NT, ND, + BS   : no suprapubic tenderness  Extremities : No edema, + 1 DP and +1 PT, feet warm   Skin : right infraclavicular implant site intact,  no hematoma, no bleeding,  no ecchymosis,   bilateral groins  flat no hematoma, no bleeding, no ecchymosis, suture removed from right groin    TELE: V pacing     EKG: BIV pacing  ms    LABS:                        13.4   8.34  )-----------( 163      ( 18 Sep 2018 07:56 )             39.4     09-18    134<L>  |  101  |  14  ----------------------------<  90  4.1   |  23  |  1.25    Ca    9.0      18 Sep 2018 05:16  Phos  3.3     09-18  Mg     1.6     09-18    TPro  6.9  /  Alb  4.1  /  TBili  0.6  /  DBili  x   /  AST  27  /  ALT  18  /  AlkPhos  86  09-17  PT/INR - ( 18 Sep 2018 07:57 )   PT: 46.3 sec;   INR: 3.98 ratio     PTT - ( 18 Sep 2018 07:57 )  PTT:52.3 sec    RADIOLOGY & ADDITIONAL TESTS:  < from: Xray Chest 1 View- PORTABLE-Urgent (09.17.18 @ 12:28) >  EXAM:  XR CHEST PORTABLE URGENT 1V                            PROCEDURE DATE:  09/17/2018            INTERPRETATION:  CLINICAL INFORMATION: Status post cardiac pacer   placement.    EXAM: Frontal radiograph of the chest.    COMPARISON: Chest radiograph from and 13 2018    FINDINGS:    There is been interval placement of a right-sided biventricular AICD with   leads in the left and right ventricle. The lungs are clear. There are no   pleural effusions or pneumothorax.    The cardiomediastinal silhouette is within normal limits. Redemonstration   of median sternotomy.    The visualized osseous structures are within normal limits.    IMPRESSION:     No pneumothorax following AICD placement.      < end of copied text >

## 2018-09-18 NOTE — PROGRESS NOTE ADULT - PROBLEM SELECTOR PROBLEM 6
Benign prostatic hyperplasia without lower urinary tract symptoms

## 2018-09-18 NOTE — PROGRESS NOTE ADULT - PROBLEM SELECTOR PLAN 6
c/w Flomax 0.4mg qhs

## 2018-09-18 NOTE — DISCHARGE NOTE ADULT - CARE PROVIDER_API CALL
Stewart Brown  Phone: (   )    -  Fax: (   )    -    Marisela Butcher), Cardiac Electrophysiology; Cardiovascular Disease; Internal Medicine  [address] [place] [address]  Phone: [phone]  Fax: [phone]

## 2018-09-18 NOTE — DISCHARGE NOTE ADULT - HOSPITAL COURSE
59 yo  male with pmhx of CHF with EF 25% on LIFE VEST, Afib, TIAs, Congential heart block s/p PPM in 1990 with upgrade to AICD in 2015, mitral valve replacement in 2000, on Coumadin. In 2015 pt noted to have a high impedance of his SVC coil and his old ICD lead was abandoned with placement of new lead.  Pt was admitted in April of 2018 to [facility] where he was found to have persistent MSSA bacteremia thought to be possibly 2/2 infected PPM leads and had been on Daptomycin since then and had a PICC line from April to end of July that became infected and was removed in July 2018.  Pt had echocardiogram in April 19 2018 which revealed left ventricular size and function with a moderately dilated LV and moderate to severe global hypokinesis with EF 25%. CT scan done showed trace pleural effusion and minimal left lung atelectasis and consolidation. PICC line placed with plan 6 weeks antibiotics. (d/c in July 2018 due to infection).  Pt had seen Dr. Butcher and was instructed to come to [facility] for planned AICD implantation. Was on Cefadroxil at home, prescribed by his outpatient infectious disease doctor. Coumadin was adjusted daily by the EP team to reach an INR between 3-3.5. Micra PPM explanted with biventricular AICD placed on 9/17/18. Was treated prophylactically with Keflex 500mg once a day while in the hospital. Remained on his cardiac meds: eplerenone 25mg QD, carvedilol 12.5mg QD, Entresto 49mg /51 mg BID; as well as Flomax 0.4 mg qhsfor BPH. The patient is medically stable and 59 yo  male with pmhx of CHF with EF 25% on LIFE VEST, Afib, TIAs, Congential heart block s/p PPM in 1990 with upgrade to AICD in 2015, mitral valve replacement in 2000, on Coumadin. In 2015 pt noted to have a high impedance of his SVC coil and his old ICD lead was abandoned with placement of new lead.  Pt was admitted in April of 2018 to [facility] where he was found to have persistent MSSA bacteremia thought to be possibly 2/2 infected PPM leads and had been on Daptomycin since then and had a PICC line from April to end of July that became infected and was removed in July 2018.  Pt had echocardiogram in April 19 2018 which revealed left ventricular size and function with a moderately dilated LV and moderate to severe global hypokinesis with EF 25%. CT scan done showed trace pleural effusion and minimal left lung atelectasis and consolidation. PICC line placed with plan 6 weeks antibiotics. (d/c in July 2018 due to infection).  Pt had seen Dr. Butcher and was instructed to come to [facility] for planned AICD implantation. Was on Cefadroxil at home, prescribed by his outpatient infectious disease doctor. Coumadin was adjusted daily by the EP team to reach an INR between 3-3.5. Micra PPM explanted with biventricular AICD placed on 9/17/18. Was treated prophylactically with Keflex 500mg once a day while in the hospital. Remained on his cardiac meds: eplerenone 25mg QD, carvedilol 12.5mg QD, Entresto 49mg /51 mg BID; as well as Flomax 0.4 mg qhsfor BPH. The device was interrogated on 9/18/18. Stitches and pressure dressings were removed by the EP team and reported a clean site without  hematoma. Per EP, the patient should maintain an INR of 4 and should continue to take 6mg of coumadin daily. The patient is medically stable and ready for discharge.

## 2018-09-18 NOTE — DISCHARGE NOTE ADULT - PLAN OF CARE
Micra PPM explant at an outside hospital with BiVentricular ICD re-implanted on 9/17/2018. Please continue to take Keflex 500 daily to prevent infection. Continue to take warfarin, digoxin 125mcg and heparin drip, magnesium carbonate 250 mg as prescribed  and follow up with your cardiologist for further management. Continue to take Flomax 0.4 every night. Please see your primary care doctor if you develop problems with urination. Most recent Echo showed an EF of 25%. Continue to take Eplerenone 25mg QD, carvedilol 12.5mg QD, Entresto 49mg /51 mg BID as prescribed and follow up with your cardiologist for further management. Intra-operative MAURI from June 2018 showed Severe left atrial enlargement with Left atrial appendage thrombus. Continue with anticoagulation with Warfarin. INR between 3-3.5 Please take your Coumadin as prescribed. functioning device Micra PPM explant at an outside hospital with BiVentricular ICD re-implanted on 9/17/2018. Please continue to take Keflex 500 daily to prevent infection. Please follow up with your cardiologist in regards to how often to have the device checked. Continue to take Coumadin 6 mg daily, digoxin 125mcg and heparin drip, magnesium carbonate 250 mg as prescribed  and follow up with your cardiologist for further management. Intra-operative MAURI from June 2018 showed Severe left atrial enlargement with Left atrial appendage thrombus. Continue with anticoagulation with Coumadin 6 mg daily. INR goal 4 Please take your Coumadin as prescribed, 6 mg daily. Please follow up with your primary care doctor about starting a statin as preventative therapy. If you experience facial droop, weakness, or any neurological change please go to the emergency room or call your primary care doctor immediately.

## 2019-12-24 NOTE — PROGRESS NOTE ADULT - PROBLEM SELECTOR PLAN 4
-C/w Coreg and digoxin.  -INR today 3.74.   -Will give coumadin 2mg tonight, per discussion with Dr. Butcher.   -Monitor coags. Patient says his INR outpatient goal by his cardiologist is usually ~3.5-4. Eyeglasses

## 2021-08-12 NOTE — STROKE CODE NOTE - NIH STROKE SCALE: 4. FACIAL PALSY, QM
[FreeTextEntry1] : She has no chest pain\par She has no shortness of breath\par She has no palpitations\par She has no syncope\par She is neurologically intact\par She has no edema\par She has no skin rashes\par 
(1) Minor paralysis (flattened nasolabial fold, asymmetry on smiling)

## 2022-02-12 NOTE — PROGRESS NOTE ADULT - PROBLEM/PLAN-4
DISPLAY PLAN FREE TEXT
[employer]

## 2022-04-23 NOTE — PATIENT PROFILE ADULT. - SUPPORT PERSON NAME
Ramin Caldwell)  MaternalFetal Medicine; Obstetrics and Gynecology  [address] [place] [id number]  Phone: [phone]  Fax: [phone]  Established Patient  Follow Up Time:    Bertha Patterson(partner)

## 2022-06-30 NOTE — PROVIDER CONTACT NOTE (OTHER) - NAME OF MD/NP/PA/DO NOTIFIED:
Condition:: Acne scarring on face Dr. Knowles Please Describe Your Condition:: Patient is interested in laser consultation